# Patient Record
Sex: FEMALE | Race: WHITE | Employment: FULL TIME | URBAN - METROPOLITAN AREA
[De-identification: names, ages, dates, MRNs, and addresses within clinical notes are randomized per-mention and may not be internally consistent; named-entity substitution may affect disease eponyms.]

---

## 2017-03-29 PROBLEM — R23.2 HOT FLASHES: Status: ACTIVE | Noted: 2017-03-29

## 2017-03-29 PROBLEM — E03.9 ACQUIRED HYPOTHYROIDISM: Status: ACTIVE | Noted: 2017-03-29

## 2017-03-29 PROBLEM — R45.4 IRRITABILITY: Status: ACTIVE | Noted: 2017-03-29

## 2018-05-16 ENCOUNTER — OFFICE VISIT (OUTPATIENT)
Dept: FAMILY MEDICINE CLINIC | Age: 63
End: 2018-05-16
Payer: COMMERCIAL

## 2018-05-16 VITALS
HEART RATE: 76 BPM | RESPIRATION RATE: 15 BRPM | OXYGEN SATURATION: 96 % | DIASTOLIC BLOOD PRESSURE: 78 MMHG | BODY MASS INDEX: 25.32 KG/M2 | SYSTOLIC BLOOD PRESSURE: 122 MMHG | HEIGHT: 60 IN | WEIGHT: 129 LBS

## 2018-05-16 DIAGNOSIS — E03.9 ACQUIRED HYPOTHYROIDISM: ICD-10-CM

## 2018-05-16 DIAGNOSIS — G89.29 CHRONIC KNEE PAIN, UNSPECIFIED LATERALITY: Primary | ICD-10-CM

## 2018-05-16 DIAGNOSIS — R23.2 HOT FLASHES: ICD-10-CM

## 2018-05-16 DIAGNOSIS — R45.4 IRRITABILITY: ICD-10-CM

## 2018-05-16 DIAGNOSIS — M25.569 CHRONIC KNEE PAIN, UNSPECIFIED LATERALITY: Primary | ICD-10-CM

## 2018-05-16 PROCEDURE — 99214 OFFICE O/P EST MOD 30 MIN: CPT | Performed by: FAMILY MEDICINE

## 2018-05-16 RX ORDER — LEVOTHYROXINE SODIUM 75 MCG
TABLET ORAL
Qty: 30 TABLET | Refills: 11 | Status: SHIPPED | OUTPATIENT
Start: 2018-05-16 | End: 2018-11-21 | Stop reason: SDUPTHER

## 2018-05-16 RX ORDER — CITALOPRAM 20 MG/1
TABLET ORAL
Qty: 30 TABLET | Refills: 5 | Status: SHIPPED | OUTPATIENT
Start: 2018-05-16 | End: 2019-11-01 | Stop reason: ALTCHOICE

## 2018-06-03 ASSESSMENT — ENCOUNTER SYMPTOMS
CHEST TIGHTNESS: 0
COUGH: 0
WHEEZING: 0
BACK PAIN: 0
NAUSEA: 0
COLOR CHANGE: 0
SHORTNESS OF BREATH: 0
VOMITING: 0
BLOOD IN STOOL: 0
EYE REDNESS: 0
CONSTIPATION: 0
ABDOMINAL PAIN: 0
DIARRHEA: 0

## 2018-07-25 ENCOUNTER — HOSPITAL ENCOUNTER (OUTPATIENT)
Dept: GENERAL RADIOLOGY | Age: 63
Discharge: HOME | End: 2018-07-27
Payer: COMMERCIAL

## 2018-07-25 DIAGNOSIS — Z12.31 VISIT FOR SCREENING MAMMOGRAM: ICD-10-CM

## 2018-07-25 PROCEDURE — 77063 BREAST TOMOSYNTHESIS BI: CPT

## 2018-08-07 ENCOUNTER — TELEPHONE (OUTPATIENT)
Dept: FAMILY MEDICINE CLINIC | Age: 63
End: 2018-08-07

## 2018-08-07 DIAGNOSIS — G89.29 CHRONIC KNEE PAIN, UNSPECIFIED LATERALITY: Primary | ICD-10-CM

## 2018-08-07 DIAGNOSIS — M25.569 CHRONIC KNEE PAIN, UNSPECIFIED LATERALITY: Primary | ICD-10-CM

## 2018-08-07 NOTE — TELEPHONE ENCOUNTER
Pt called and is asking for a referral for Dr Shawanda Starks, and would like it faxed to [phone]  Please let patient know when this has been done [phone]

## 2018-11-21 ENCOUNTER — OFFICE VISIT (OUTPATIENT)
Dept: FAMILY MEDICINE CLINIC | Age: 63
End: 2018-11-21
Payer: COMMERCIAL

## 2018-11-21 VITALS
WEIGHT: 132 LBS | HEIGHT: 60 IN | DIASTOLIC BLOOD PRESSURE: 72 MMHG | TEMPERATURE: 97.2 F | BODY MASS INDEX: 25.91 KG/M2 | HEART RATE: 69 BPM | SYSTOLIC BLOOD PRESSURE: 134 MMHG | RESPIRATION RATE: 14 BRPM | OXYGEN SATURATION: 98 %

## 2018-11-21 DIAGNOSIS — E03.9 ACQUIRED HYPOTHYROIDISM: ICD-10-CM

## 2018-11-21 DIAGNOSIS — Z00.00 ENCOUNTER FOR PREVENTIVE HEALTH EXAMINATION: Primary | ICD-10-CM

## 2018-11-21 PROCEDURE — 99396 PREV VISIT EST AGE 40-64: CPT | Performed by: FAMILY MEDICINE

## 2018-11-21 RX ORDER — LEVOTHYROXINE SODIUM 75 MCG
TABLET ORAL
Qty: 30 TABLET | Refills: 11 | Status: SHIPPED | OUTPATIENT
Start: 2018-11-21 | End: 2018-12-02

## 2018-11-21 ASSESSMENT — ENCOUNTER SYMPTOMS
CONSTIPATION: 0
BACK PAIN: 0
ABDOMINAL PAIN: 0
WHEEZING: 0
DIARRHEA: 0
BLOOD IN STOOL: 0
VOMITING: 0
NAUSEA: 0
COUGH: 0
COLOR CHANGE: 0
CHEST TIGHTNESS: 0
SHORTNESS OF BREATH: 0
EYE REDNESS: 0

## 2018-11-21 ASSESSMENT — PATIENT HEALTH QUESTIONNAIRE - PHQ9
SUM OF ALL RESPONSES TO PHQ QUESTIONS 1-9: 0
2. FEELING DOWN, DEPRESSED OR HOPELESS: 0
SUM OF ALL RESPONSES TO PHQ QUESTIONS 1-9: 0
1. LITTLE INTEREST OR PLEASURE IN DOING THINGS: 0
SUM OF ALL RESPONSES TO PHQ9 QUESTIONS 1 & 2: 0

## 2018-11-28 ENCOUNTER — HOSPITAL ENCOUNTER (OUTPATIENT)
Age: 63
Discharge: HOME | End: 2018-11-28
Payer: COMMERCIAL

## 2018-11-28 DIAGNOSIS — E03.9 ACQUIRED HYPOTHYROIDISM: ICD-10-CM

## 2018-11-28 DIAGNOSIS — Z00.00 ENCOUNTER FOR PREVENTIVE HEALTH EXAMINATION: ICD-10-CM

## 2018-11-28 LAB
ALBUMIN SERPL-MCNC: 4.5 G/DL (ref 3.5–5.2)
ALP BLD-CCNC: 100 U/L (ref 35–104)
ALT SERPL-CCNC: 19 U/L (ref 0–32)
ANION GAP SERPL CALCULATED.3IONS-SCNC: 10 MMOL/L (ref 7–16)
AST SERPL-CCNC: 27 U/L (ref 0–31)
BASOPHILS ABSOLUTE: 0.04 E9/L (ref 0–0.2)
BASOPHILS RELATIVE PERCENT: 1 % (ref 0–2)
BILIRUB SERPL-MCNC: 0.6 MG/DL (ref 0–1.2)
BUN BLDV-MCNC: 11 MG/DL (ref 8–23)
CALCIUM SERPL-MCNC: 8.4 MG/DL (ref 8.6–10.2)
CHLORIDE BLD-SCNC: 104 MMOL/L (ref 98–107)
CHOLESTEROL, TOTAL: 218 MG/DL (ref 0–199)
CO2: 29 MMOL/L (ref 22–29)
CREAT SERPL-MCNC: 0.9 MG/DL (ref 0.5–1)
EOSINOPHILS ABSOLUTE: 0.29 E9/L (ref 0.05–0.5)
EOSINOPHILS RELATIVE PERCENT: 7.1 % (ref 0–6)
GFR AFRICAN AMERICAN: >60
GFR NON-AFRICAN AMERICAN: >60 ML/MIN/1.73
GLUCOSE BLD-MCNC: 105 MG/DL (ref 74–99)
HCT VFR BLD CALC: 40.5 % (ref 34–48)
HDLC SERPL-MCNC: 76 MG/DL
HEMOGLOBIN: 13.1 G/DL (ref 11.5–15.5)
IMMATURE GRANULOCYTES #: 0 E9/L
IMMATURE GRANULOCYTES %: 0 % (ref 0–5)
LDL CHOLESTEROL CALCULATED: 124 MG/DL (ref 0–99)
LYMPHOCYTES ABSOLUTE: 1.3 E9/L (ref 1.5–4)
LYMPHOCYTES RELATIVE PERCENT: 31.9 % (ref 20–42)
MCH RBC QN AUTO: 29.6 PG (ref 26–35)
MCHC RBC AUTO-ENTMCNC: 32.3 % (ref 32–34.5)
MCV RBC AUTO: 91.4 FL (ref 80–99.9)
MONOCYTES ABSOLUTE: 0.36 E9/L (ref 0.1–0.95)
MONOCYTES RELATIVE PERCENT: 8.8 % (ref 2–12)
NEUTROPHILS ABSOLUTE: 2.08 E9/L (ref 1.8–7.3)
NEUTROPHILS RELATIVE PERCENT: 51.2 % (ref 43–80)
PDW BLD-RTO: 13.2 FL (ref 11.5–15)
PLATELET # BLD: 281 E9/L (ref 130–450)
PMV BLD AUTO: 11 FL (ref 7–12)
POTASSIUM SERPL-SCNC: 4 MMOL/L (ref 3.5–5)
RBC # BLD: 4.43 E12/L (ref 3.5–5.5)
SODIUM BLD-SCNC: 143 MMOL/L (ref 132–146)
TOTAL PROTEIN: 7 G/DL (ref 6.4–8.3)
TRIGL SERPL-MCNC: 88 MG/DL (ref 0–149)
TSH SERPL DL<=0.05 MIU/L-ACNC: 0.16 UIU/ML (ref 0.27–4.2)
VLDLC SERPL CALC-MCNC: 18 MG/DL
WBC # BLD: 4.1 E9/L (ref 4.5–11.5)

## 2018-11-28 PROCEDURE — 84443 ASSAY THYROID STIM HORMONE: CPT

## 2018-11-28 PROCEDURE — 36415 COLL VENOUS BLD VENIPUNCTURE: CPT

## 2018-11-28 PROCEDURE — 85025 COMPLETE CBC W/AUTO DIFF WBC: CPT

## 2018-11-28 PROCEDURE — 80053 COMPREHEN METABOLIC PANEL: CPT

## 2018-11-28 PROCEDURE — 80061 LIPID PANEL: CPT

## 2018-12-02 DIAGNOSIS — E03.9 ACQUIRED HYPOTHYROIDISM: ICD-10-CM

## 2018-12-02 RX ORDER — LEVOTHYROXINE SODIUM 50 MCG
50 TABLET ORAL DAILY
Qty: 30 TABLET | Refills: 2 | Status: SHIPPED | OUTPATIENT
Start: 2018-12-02 | End: 2019-01-29 | Stop reason: SDUPTHER

## 2019-01-29 DIAGNOSIS — E03.9 ACQUIRED HYPOTHYROIDISM: ICD-10-CM

## 2019-01-30 RX ORDER — LEVOTHYROXINE SODIUM 50 MCG
50 TABLET ORAL DAILY
Qty: 30 TABLET | Refills: 2 | Status: SHIPPED | OUTPATIENT
Start: 2019-01-30 | End: 2019-05-28 | Stop reason: SDUPTHER

## 2019-05-28 DIAGNOSIS — E03.9 ACQUIRED HYPOTHYROIDISM: ICD-10-CM

## 2019-05-28 RX ORDER — LEVOTHYROXINE SODIUM 50 MCG
TABLET ORAL
Qty: 30 TABLET | Refills: 0 | Status: SHIPPED | OUTPATIENT
Start: 2019-05-28 | End: 2019-06-23 | Stop reason: SDUPTHER

## 2019-06-23 DIAGNOSIS — E03.9 ACQUIRED HYPOTHYROIDISM: ICD-10-CM

## 2019-06-26 RX ORDER — LEVOTHYROXINE SODIUM 50 MCG
TABLET ORAL
Qty: 30 TABLET | Refills: 5 | Status: SHIPPED | OUTPATIENT
Start: 2019-06-26 | End: 2019-11-13 | Stop reason: ALTCHOICE

## 2019-09-05 ENCOUNTER — HOSPITAL ENCOUNTER (OUTPATIENT)
Dept: GENERAL RADIOLOGY | Age: 64
Discharge: HOME | End: 2019-09-07
Payer: COMMERCIAL

## 2019-09-05 DIAGNOSIS — Z12.31 VISIT FOR SCREENING MAMMOGRAM: ICD-10-CM

## 2019-09-05 PROCEDURE — 77063 BREAST TOMOSYNTHESIS BI: CPT

## 2019-09-09 ENCOUNTER — TELEPHONE (OUTPATIENT)
Dept: ONCOLOGY | Age: 64
End: 2019-09-09

## 2019-09-12 ENCOUNTER — HOSPITAL ENCOUNTER (OUTPATIENT)
Dept: GENERAL RADIOLOGY | Age: 64
Discharge: HOME | End: 2019-09-14
Payer: COMMERCIAL

## 2019-09-12 DIAGNOSIS — R92.8 ABNORMAL MAMMOGRAM: ICD-10-CM

## 2019-09-12 PROCEDURE — 77063 BREAST TOMOSYNTHESIS BI: CPT

## 2019-09-12 PROCEDURE — 77065 DX MAMMO INCL CAD UNI: CPT

## 2019-09-13 ENCOUNTER — HOSPITAL ENCOUNTER (OUTPATIENT)
Dept: GENERAL RADIOLOGY | Age: 64
Discharge: HOME | End: 2019-09-15
Payer: COMMERCIAL

## 2019-09-13 DIAGNOSIS — R92.8 ABNORMALITY OF RIGHT BREAST ON SCREENING MAMMOGRAM: ICD-10-CM

## 2019-09-13 PROCEDURE — 2500000003 HC RX 250 WO HCPCS

## 2019-09-13 PROCEDURE — 2720000010 MAM STEREO BREAST BX W LOC DEVICE 1ST LESION RIGHT

## 2019-09-13 PROCEDURE — 77065 DX MAMMO INCL CAD UNI: CPT

## 2019-09-13 PROCEDURE — 88305 TISSUE EXAM BY PATHOLOGIST: CPT

## 2019-09-18 ENCOUNTER — TELEPHONE (OUTPATIENT)
Dept: GENERAL RADIOLOGY | Age: 64
End: 2019-09-18

## 2019-09-19 ENCOUNTER — TELEPHONE (OUTPATIENT)
Dept: BREAST CENTER | Age: 64
End: 2019-09-19

## 2019-10-01 ENCOUNTER — OFFICE VISIT (OUTPATIENT)
Dept: BREAST CENTER | Age: 64
End: 2019-10-01
Payer: COMMERCIAL

## 2019-10-01 VITALS
TEMPERATURE: 97.8 F | DIASTOLIC BLOOD PRESSURE: 76 MMHG | RESPIRATION RATE: 16 BRPM | SYSTOLIC BLOOD PRESSURE: 128 MMHG | HEIGHT: 60 IN | BODY MASS INDEX: 25.72 KG/M2 | HEART RATE: 71 BPM | WEIGHT: 131 LBS | OXYGEN SATURATION: 98 %

## 2019-10-01 DIAGNOSIS — N60.99 ATYPICAL DUCTAL HYPERPLASIA OF BREAST: ICD-10-CM

## 2019-10-01 PROCEDURE — 99204 OFFICE O/P NEW MOD 45 MIN: CPT | Performed by: SURGERY

## 2019-10-01 PROCEDURE — 99203 OFFICE O/P NEW LOW 30 MIN: CPT | Performed by: SURGERY

## 2019-10-08 ENCOUNTER — TELEPHONE (OUTPATIENT)
Dept: BREAST CENTER | Age: 64
End: 2019-10-08

## 2019-10-10 ENCOUNTER — TELEPHONE (OUTPATIENT)
Dept: BREAST CENTER | Age: 64
End: 2019-10-10

## 2019-10-20 ENCOUNTER — PREP FOR PROCEDURE (OUTPATIENT)
Dept: SURGERY | Age: 64
End: 2019-10-20

## 2019-10-20 RX ORDER — SODIUM CHLORIDE 0.9 % (FLUSH) 0.9 %
10 SYRINGE (ML) INJECTION PRN
Status: CANCELLED | OUTPATIENT
Start: 2019-10-20

## 2019-10-20 RX ORDER — SODIUM CHLORIDE, SODIUM LACTATE, POTASSIUM CHLORIDE, CALCIUM CHLORIDE 600; 310; 30; 20 MG/100ML; MG/100ML; MG/100ML; MG/100ML
INJECTION, SOLUTION INTRAVENOUS CONTINUOUS
Status: CANCELLED | OUTPATIENT
Start: 2019-10-20

## 2019-10-20 RX ORDER — SODIUM CHLORIDE 0.9 % (FLUSH) 0.9 %
10 SYRINGE (ML) INJECTION EVERY 12 HOURS SCHEDULED
Status: CANCELLED | OUTPATIENT
Start: 2019-10-20

## 2019-10-29 ENCOUNTER — TELEPHONE (OUTPATIENT)
Dept: ADMINISTRATIVE | Age: 64
End: 2019-10-29

## 2019-11-06 ENCOUNTER — APPOINTMENT (OUTPATIENT)
Dept: GENERAL RADIOLOGY | Age: 64
End: 2019-11-06
Attending: SURGERY
Payer: COMMERCIAL

## 2019-11-06 ENCOUNTER — HOSPITAL ENCOUNTER (OUTPATIENT)
Dept: GENERAL RADIOLOGY | Age: 64
Setting detail: OUTPATIENT SURGERY
Discharge: HOME | End: 2019-11-08
Attending: SURGERY
Payer: COMMERCIAL

## 2019-11-06 ENCOUNTER — ANESTHESIA EVENT (OUTPATIENT)
Dept: OPERATING ROOM | Age: 64
End: 2019-11-06
Payer: COMMERCIAL

## 2019-11-06 ENCOUNTER — HOSPITAL ENCOUNTER (OUTPATIENT)
Age: 64
Setting detail: OUTPATIENT SURGERY
Discharge: HOME | End: 2019-11-06
Attending: SURGERY | Admitting: SURGERY
Payer: COMMERCIAL

## 2019-11-06 ENCOUNTER — ANESTHESIA (OUTPATIENT)
Dept: OPERATING ROOM | Age: 64
End: 2019-11-06
Payer: COMMERCIAL

## 2019-11-06 VITALS
BODY MASS INDEX: 24.54 KG/M2 | OXYGEN SATURATION: 99 % | WEIGHT: 125 LBS | TEMPERATURE: 97 F | DIASTOLIC BLOOD PRESSURE: 59 MMHG | SYSTOLIC BLOOD PRESSURE: 131 MMHG | HEART RATE: 68 BPM | HEIGHT: 60 IN | RESPIRATION RATE: 16 BRPM

## 2019-11-06 VITALS
SYSTOLIC BLOOD PRESSURE: 98 MMHG | RESPIRATION RATE: 9 BRPM | OXYGEN SATURATION: 100 % | DIASTOLIC BLOOD PRESSURE: 75 MMHG

## 2019-11-06 DIAGNOSIS — G89.18 POST-OP PAIN: Primary | ICD-10-CM

## 2019-11-06 DIAGNOSIS — N60.91 ATYPICAL DUCTAL HYPERPLASIA OF RIGHT BREAST: ICD-10-CM

## 2019-11-06 PROCEDURE — 3600000003 HC SURGERY LEVEL 3 BASE: Performed by: SURGERY

## 2019-11-06 PROCEDURE — 6360000002 HC RX W HCPCS: Performed by: NURSE ANESTHETIST, CERTIFIED REGISTERED

## 2019-11-06 PROCEDURE — 19125 EXCISION BREAST LESION: CPT | Performed by: SURGERY

## 2019-11-06 PROCEDURE — 19281 PERQ DEVICE BREAST 1ST IMAG: CPT

## 2019-11-06 PROCEDURE — 2580000003 HC RX 258: Performed by: SURGERY

## 2019-11-06 PROCEDURE — 3600000013 HC SURGERY LEVEL 3 ADDTL 15MIN: Performed by: SURGERY

## 2019-11-06 PROCEDURE — 2709999900 HC NON-CHARGEABLE SUPPLY: Performed by: SURGERY

## 2019-11-06 PROCEDURE — 88307 TISSUE EXAM BY PATHOLOGIST: CPT

## 2019-11-06 PROCEDURE — 2500000003 HC RX 250 WO HCPCS

## 2019-11-06 PROCEDURE — 6360000002 HC RX W HCPCS: Performed by: SURGERY

## 2019-11-06 PROCEDURE — 76098 X-RAY EXAM SURGICAL SPECIMEN: CPT

## 2019-11-06 PROCEDURE — 2720000010 HC SURG SUPPLY STERILE: Performed by: SURGERY

## 2019-11-06 PROCEDURE — 3700000000 HC ANESTHESIA ATTENDED CARE: Performed by: SURGERY

## 2019-11-06 PROCEDURE — 2500000003 HC RX 250 WO HCPCS: Performed by: SURGERY

## 2019-11-06 PROCEDURE — 3700000001 HC ADD 15 MINUTES (ANESTHESIA): Performed by: SURGERY

## 2019-11-06 PROCEDURE — 7100000011 HC PHASE II RECOVERY - ADDTL 15 MIN: Performed by: SURGERY

## 2019-11-06 PROCEDURE — 7100000010 HC PHASE II RECOVERY - FIRST 15 MIN: Performed by: SURGERY

## 2019-11-06 RX ORDER — LIDOCAINE HYDROCHLORIDE 20 MG/ML
INJECTION, SOLUTION INTRAVENOUS PRN
Status: DISCONTINUED | OUTPATIENT
Start: 2019-11-06 | End: 2019-11-06 | Stop reason: SDUPTHER

## 2019-11-06 RX ORDER — PROPOFOL 10 MG/ML
INJECTION, EMULSION INTRAVENOUS CONTINUOUS PRN
Status: DISCONTINUED | OUTPATIENT
Start: 2019-11-06 | End: 2019-11-06 | Stop reason: SDUPTHER

## 2019-11-06 RX ORDER — FENTANYL CITRATE 50 UG/ML
INJECTION, SOLUTION INTRAMUSCULAR; INTRAVENOUS PRN
Status: DISCONTINUED | OUTPATIENT
Start: 2019-11-06 | End: 2019-11-06 | Stop reason: SDUPTHER

## 2019-11-06 RX ORDER — CEFAZOLIN SODIUM 2 G/50ML
2 SOLUTION INTRAVENOUS
Status: COMPLETED | OUTPATIENT
Start: 2019-11-06 | End: 2019-11-06

## 2019-11-06 RX ORDER — HYDROCODONE BITARTRATE AND ACETAMINOPHEN 5; 325 MG/1; MG/1
1 TABLET ORAL EVERY 4 HOURS PRN
Qty: 5 TABLET | Refills: 0 | Status: SHIPPED | OUTPATIENT
Start: 2019-11-06 | End: 2019-11-07

## 2019-11-06 RX ORDER — BUPIVACAINE HYDROCHLORIDE AND EPINEPHRINE 2.5; 5 MG/ML; UG/ML
INJECTION, SOLUTION EPIDURAL; INFILTRATION; INTRACAUDAL; PERINEURAL PRN
Status: DISCONTINUED | OUTPATIENT
Start: 2019-11-06 | End: 2019-11-06 | Stop reason: ALTCHOICE

## 2019-11-06 RX ORDER — SODIUM CHLORIDE 0.9 % (FLUSH) 0.9 %
10 SYRINGE (ML) INJECTION EVERY 12 HOURS SCHEDULED
Status: DISCONTINUED | OUTPATIENT
Start: 2019-11-06 | End: 2019-11-06 | Stop reason: HOSPADM

## 2019-11-06 RX ORDER — MIDAZOLAM HYDROCHLORIDE 1 MG/ML
INJECTION INTRAMUSCULAR; INTRAVENOUS PRN
Status: DISCONTINUED | OUTPATIENT
Start: 2019-11-06 | End: 2019-11-06 | Stop reason: SDUPTHER

## 2019-11-06 RX ORDER — SODIUM CHLORIDE, SODIUM LACTATE, POTASSIUM CHLORIDE, CALCIUM CHLORIDE 600; 310; 30; 20 MG/100ML; MG/100ML; MG/100ML; MG/100ML
INJECTION, SOLUTION INTRAVENOUS CONTINUOUS
Status: DISCONTINUED | OUTPATIENT
Start: 2019-11-06 | End: 2019-11-06 | Stop reason: HOSPADM

## 2019-11-06 RX ORDER — SODIUM CHLORIDE 0.9 % (FLUSH) 0.9 %
10 SYRINGE (ML) INJECTION PRN
Status: DISCONTINUED | OUTPATIENT
Start: 2019-11-06 | End: 2019-11-06 | Stop reason: HOSPADM

## 2019-11-06 RX ADMIN — LIDOCAINE HYDROCHLORIDE 50 MG: 20 INJECTION, SOLUTION INTRAVENOUS at 14:20

## 2019-11-06 RX ADMIN — PROPOFOL 75 MCG/KG/MIN: 10 INJECTION, EMULSION INTRAVENOUS at 14:20

## 2019-11-06 RX ADMIN — Medication 10 ML: at 09:50

## 2019-11-06 RX ADMIN — CEFAZOLIN SODIUM 2 G: 2 SOLUTION INTRAVENOUS at 14:28

## 2019-11-06 RX ADMIN — FENTANYL CITRATE 50 MCG: 50 INJECTION, SOLUTION INTRAMUSCULAR; INTRAVENOUS at 14:31

## 2019-11-06 RX ADMIN — FENTANYL CITRATE 25 MCG: 50 INJECTION, SOLUTION INTRAMUSCULAR; INTRAVENOUS at 14:52

## 2019-11-06 RX ADMIN — FENTANYL CITRATE 25 MCG: 50 INJECTION, SOLUTION INTRAMUSCULAR; INTRAVENOUS at 14:19

## 2019-11-06 RX ADMIN — MIDAZOLAM 2 MG: 1 INJECTION INTRAMUSCULAR; INTRAVENOUS at 14:12

## 2019-11-06 RX ADMIN — SODIUM CHLORIDE, POTASSIUM CHLORIDE, SODIUM LACTATE AND CALCIUM CHLORIDE: 600; 310; 30; 20 INJECTION, SOLUTION INTRAVENOUS at 14:13

## 2019-11-06 ASSESSMENT — PULMONARY FUNCTION TESTS
PIF_VALUE: 0
PIF_VALUE: 1
PIF_VALUE: 0

## 2019-11-06 ASSESSMENT — PAIN SCALES - GENERAL
PAINLEVEL_OUTOF10: 0

## 2019-11-06 ASSESSMENT — PAIN - FUNCTIONAL ASSESSMENT: PAIN_FUNCTIONAL_ASSESSMENT: 0-10

## 2019-11-11 ENCOUNTER — TELEPHONE (OUTPATIENT)
Dept: BREAST CENTER | Age: 64
End: 2019-11-11

## 2019-11-12 ENCOUNTER — OFFICE VISIT (OUTPATIENT)
Dept: FAMILY MEDICINE CLINIC | Age: 64
End: 2019-11-12
Payer: COMMERCIAL

## 2019-11-12 ENCOUNTER — HOSPITAL ENCOUNTER (OUTPATIENT)
Age: 64
Discharge: HOME | End: 2019-11-14
Payer: COMMERCIAL

## 2019-11-12 VITALS
HEART RATE: 73 BPM | HEIGHT: 60 IN | RESPIRATION RATE: 20 BRPM | SYSTOLIC BLOOD PRESSURE: 120 MMHG | OXYGEN SATURATION: 99 % | DIASTOLIC BLOOD PRESSURE: 74 MMHG | BODY MASS INDEX: 25.52 KG/M2 | WEIGHT: 130 LBS | TEMPERATURE: 98.1 F

## 2019-11-12 DIAGNOSIS — E03.9 ACQUIRED HYPOTHYROIDISM: ICD-10-CM

## 2019-11-12 DIAGNOSIS — Z23 NEEDS FLU SHOT: ICD-10-CM

## 2019-11-12 DIAGNOSIS — E03.9 ACQUIRED HYPOTHYROIDISM: Primary | ICD-10-CM

## 2019-11-12 DIAGNOSIS — G89.29 CHRONIC LEFT-SIDED LOW BACK PAIN WITHOUT SCIATICA: ICD-10-CM

## 2019-11-12 DIAGNOSIS — M54.50 CHRONIC LEFT-SIDED LOW BACK PAIN WITHOUT SCIATICA: ICD-10-CM

## 2019-11-12 LAB
ALBUMIN SERPL-MCNC: 4.6 G/DL (ref 3.5–5.2)
ALP BLD-CCNC: 98 U/L (ref 35–104)
ALT SERPL-CCNC: 21 U/L (ref 0–32)
ANION GAP SERPL CALCULATED.3IONS-SCNC: 16 MMOL/L (ref 7–16)
AST SERPL-CCNC: 31 U/L (ref 0–31)
BASOPHILS ABSOLUTE: 0.03 E9/L (ref 0–0.2)
BASOPHILS RELATIVE PERCENT: 0.6 % (ref 0–2)
BILIRUB SERPL-MCNC: 0.5 MG/DL (ref 0–1.2)
BUN BLDV-MCNC: 12 MG/DL (ref 8–23)
CALCIUM SERPL-MCNC: 8.8 MG/DL (ref 8.6–10.2)
CHLORIDE BLD-SCNC: 102 MMOL/L (ref 98–107)
CO2: 25 MMOL/L (ref 22–29)
CREAT SERPL-MCNC: 0.9 MG/DL (ref 0.5–1)
EOSINOPHILS ABSOLUTE: 0.28 E9/L (ref 0.05–0.5)
EOSINOPHILS RELATIVE PERCENT: 5.8 % (ref 0–6)
GFR AFRICAN AMERICAN: >60
GFR NON-AFRICAN AMERICAN: >60 ML/MIN/1.73
GLUCOSE BLD-MCNC: 95 MG/DL (ref 74–99)
HCT VFR BLD CALC: 42.1 % (ref 34–48)
HEMOGLOBIN: 13.2 G/DL (ref 11.5–15.5)
IMMATURE GRANULOCYTES #: 0.01 E9/L
IMMATURE GRANULOCYTES %: 0.2 % (ref 0–5)
LYMPHOCYTES ABSOLUTE: 1.28 E9/L (ref 1.5–4)
LYMPHOCYTES RELATIVE PERCENT: 26.4 % (ref 20–42)
MCH RBC QN AUTO: 28.8 PG (ref 26–35)
MCHC RBC AUTO-ENTMCNC: 31.4 % (ref 32–34.5)
MCV RBC AUTO: 91.7 FL (ref 80–99.9)
MONOCYTES ABSOLUTE: 0.36 E9/L (ref 0.1–0.95)
MONOCYTES RELATIVE PERCENT: 7.4 % (ref 2–12)
NEUTROPHILS ABSOLUTE: 2.88 E9/L (ref 1.8–7.3)
NEUTROPHILS RELATIVE PERCENT: 59.6 % (ref 43–80)
PDW BLD-RTO: 13.3 FL (ref 11.5–15)
PLATELET # BLD: 292 E9/L (ref 130–450)
PMV BLD AUTO: 11.9 FL (ref 7–12)
POTASSIUM SERPL-SCNC: 4.3 MMOL/L (ref 3.5–5)
RBC # BLD: 4.59 E12/L (ref 3.5–5.5)
SODIUM BLD-SCNC: 143 MMOL/L (ref 132–146)
TOTAL PROTEIN: 7.3 G/DL (ref 6.4–8.3)
WBC # BLD: 4.8 E9/L (ref 4.5–11.5)

## 2019-11-12 PROCEDURE — 90686 IIV4 VACC NO PRSV 0.5 ML IM: CPT | Performed by: FAMILY MEDICINE

## 2019-11-12 PROCEDURE — 84439 ASSAY OF FREE THYROXINE: CPT

## 2019-11-12 PROCEDURE — 90471 IMMUNIZATION ADMIN: CPT | Performed by: FAMILY MEDICINE

## 2019-11-12 PROCEDURE — 99214 OFFICE O/P EST MOD 30 MIN: CPT | Performed by: FAMILY MEDICINE

## 2019-11-12 PROCEDURE — 85025 COMPLETE CBC W/AUTO DIFF WBC: CPT

## 2019-11-12 PROCEDURE — 84443 ASSAY THYROID STIM HORMONE: CPT

## 2019-11-12 PROCEDURE — 80053 COMPREHEN METABOLIC PANEL: CPT

## 2019-11-12 ASSESSMENT — PATIENT HEALTH QUESTIONNAIRE - PHQ9
SUM OF ALL RESPONSES TO PHQ9 QUESTIONS 1 & 2: 0
2. FEELING DOWN, DEPRESSED OR HOPELESS: 0
1. LITTLE INTEREST OR PLEASURE IN DOING THINGS: 0
SUM OF ALL RESPONSES TO PHQ QUESTIONS 1-9: 0
SUM OF ALL RESPONSES TO PHQ QUESTIONS 1-9: 0

## 2019-11-13 ENCOUNTER — TELEPHONE (OUTPATIENT)
Dept: FAMILY MEDICINE CLINIC | Age: 64
End: 2019-11-13

## 2019-11-13 DIAGNOSIS — E03.9 ACQUIRED HYPOTHYROIDISM: ICD-10-CM

## 2019-11-13 LAB
T4 FREE: 0.95 NG/DL (ref 0.93–1.7)
TSH SERPL DL<=0.05 MIU/L-ACNC: 6.67 UIU/ML (ref 0.27–4.2)

## 2019-11-13 RX ORDER — LEVOTHYROXINE SODIUM 75 MCG
75 TABLET ORAL DAILY
Qty: 30 TABLET | Refills: 3
Start: 2019-11-13 | End: 2019-11-18 | Stop reason: SDUPTHER

## 2019-11-18 ENCOUNTER — TELEPHONE (OUTPATIENT)
Dept: FAMILY MEDICINE CLINIC | Age: 64
End: 2019-11-18

## 2019-11-18 DIAGNOSIS — E03.9 ACQUIRED HYPOTHYROIDISM: ICD-10-CM

## 2019-11-18 RX ORDER — LEVOTHYROXINE SODIUM 75 MCG
75 TABLET ORAL DAILY
Qty: 30 TABLET | Refills: 3 | Status: SHIPPED
Start: 2019-11-18 | End: 2020-02-11 | Stop reason: SDUPTHER

## 2019-11-21 ENCOUNTER — OFFICE VISIT (OUTPATIENT)
Dept: BREAST CENTER | Age: 64
End: 2019-11-21
Payer: COMMERCIAL

## 2019-11-21 VITALS
HEART RATE: 69 BPM | OXYGEN SATURATION: 98 % | RESPIRATION RATE: 16 BRPM | BODY MASS INDEX: 25.32 KG/M2 | DIASTOLIC BLOOD PRESSURE: 70 MMHG | WEIGHT: 129 LBS | TEMPERATURE: 98 F | SYSTOLIC BLOOD PRESSURE: 108 MMHG | HEIGHT: 60 IN

## 2019-11-21 DIAGNOSIS — Z48.89 ENCOUNTER FOR POSTOPERATIVE CARE: Primary | ICD-10-CM

## 2019-11-21 PROCEDURE — 99024 POSTOP FOLLOW-UP VISIT: CPT | Performed by: SURGERY

## 2020-02-11 ENCOUNTER — HOSPITAL ENCOUNTER (OUTPATIENT)
Age: 65
Discharge: HOME | End: 2020-02-13
Payer: COMMERCIAL

## 2020-02-11 ENCOUNTER — TELEPHONE (OUTPATIENT)
Dept: FAMILY MEDICINE CLINIC | Age: 65
End: 2020-02-11

## 2020-02-11 ENCOUNTER — NURSE ONLY (OUTPATIENT)
Dept: FAMILY MEDICINE CLINIC | Age: 65
End: 2020-02-11
Payer: COMMERCIAL

## 2020-02-11 ENCOUNTER — OFFICE VISIT (OUTPATIENT)
Dept: FAMILY MEDICINE CLINIC | Age: 65
End: 2020-02-11
Payer: COMMERCIAL

## 2020-02-11 VITALS
OXYGEN SATURATION: 98 % | RESPIRATION RATE: 16 BRPM | HEIGHT: 61 IN | WEIGHT: 131.4 LBS | BODY MASS INDEX: 24.81 KG/M2 | SYSTOLIC BLOOD PRESSURE: 122 MMHG | TEMPERATURE: 97.4 F | HEART RATE: 74 BPM | DIASTOLIC BLOOD PRESSURE: 64 MMHG

## 2020-02-11 LAB
BASOPHILS ABSOLUTE: 0.04 E9/L (ref 0–0.2)
BASOPHILS RELATIVE PERCENT: 0.8 % (ref 0–2)
EOSINOPHILS ABSOLUTE: 0.44 E9/L (ref 0.05–0.5)
EOSINOPHILS RELATIVE PERCENT: 8.4 % (ref 0–6)
HCT VFR BLD CALC: 39.8 % (ref 34–48)
HEMOGLOBIN: 12.3 G/DL (ref 11.5–15.5)
IMMATURE GRANULOCYTES #: 0.01 E9/L
IMMATURE GRANULOCYTES %: 0.2 % (ref 0–5)
LYMPHOCYTES ABSOLUTE: 1.77 E9/L (ref 1.5–4)
LYMPHOCYTES RELATIVE PERCENT: 33.8 % (ref 20–42)
MCH RBC QN AUTO: 28.9 PG (ref 26–35)
MCHC RBC AUTO-ENTMCNC: 30.9 % (ref 32–34.5)
MCV RBC AUTO: 93.4 FL (ref 80–99.9)
MONOCYTES ABSOLUTE: 0.51 E9/L (ref 0.1–0.95)
MONOCYTES RELATIVE PERCENT: 9.7 % (ref 2–12)
NEUTROPHILS ABSOLUTE: 2.47 E9/L (ref 1.8–7.3)
NEUTROPHILS RELATIVE PERCENT: 47.1 % (ref 43–80)
PDW BLD-RTO: 13.5 FL (ref 11.5–15)
PLATELET # BLD: 270 E9/L (ref 130–450)
PMV BLD AUTO: 11.3 FL (ref 7–12)
RBC # BLD: 4.26 E12/L (ref 3.5–5.5)
T4 FREE: 1.12 NG/DL (ref 0.93–1.7)
TSH SERPL DL<=0.05 MIU/L-ACNC: 1.31 UIU/ML (ref 0.27–4.2)
WBC # BLD: 5.2 E9/L (ref 4.5–11.5)

## 2020-02-11 PROCEDURE — 99214 OFFICE O/P EST MOD 30 MIN: CPT | Performed by: FAMILY MEDICINE

## 2020-02-11 PROCEDURE — 84443 ASSAY THYROID STIM HORMONE: CPT

## 2020-02-11 PROCEDURE — 36415 COLL VENOUS BLD VENIPUNCTURE: CPT | Performed by: FAMILY MEDICINE

## 2020-02-11 PROCEDURE — 85025 COMPLETE CBC W/AUTO DIFF WBC: CPT

## 2020-02-11 PROCEDURE — 84439 ASSAY OF FREE THYROXINE: CPT

## 2020-02-11 RX ORDER — FLUTICASONE PROPIONATE 50 MCG
1 SPRAY, SUSPENSION (ML) NASAL DAILY
Qty: 1 BOTTLE | Refills: 3 | Status: SHIPPED
Start: 2020-02-11 | End: 2022-01-25

## 2020-02-11 RX ORDER — LEVOTHYROXINE SODIUM 75 MCG
75 TABLET ORAL DAILY
Qty: 30 TABLET | Refills: 3 | Status: SHIPPED
Start: 2020-02-11 | End: 2020-06-17

## 2020-02-11 RX ORDER — AZITHROMYCIN 250 MG/1
250 TABLET, FILM COATED ORAL SEE ADMIN INSTRUCTIONS
Qty: 6 TABLET | Refills: 0 | Status: SHIPPED | OUTPATIENT
Start: 2020-02-11 | End: 2020-02-16

## 2020-02-11 ASSESSMENT — ENCOUNTER SYMPTOMS
SHORTNESS OF BREATH: 0
NAUSEA: 0
SINUS PAIN: 1
RHINORRHEA: 1
SINUS PRESSURE: 1
BACK PAIN: 0
COUGH: 1
WHEEZING: 1
ABDOMINAL PAIN: 0
DIARRHEA: 0
SORE THROAT: 1
CONSTIPATION: 0
VOMITING: 0

## 2020-02-11 ASSESSMENT — PATIENT HEALTH QUESTIONNAIRE - PHQ9
2. FEELING DOWN, DEPRESSED OR HOPELESS: 0
SUM OF ALL RESPONSES TO PHQ9 QUESTIONS 1 & 2: 0
1. LITTLE INTEREST OR PLEASURE IN DOING THINGS: 0
SUM OF ALL RESPONSES TO PHQ QUESTIONS 1-9: 0
SUM OF ALL RESPONSES TO PHQ QUESTIONS 1-9: 0

## 2020-02-11 NOTE — TELEPHONE ENCOUNTER
Jory Mims was in the office this morning to have blood drawn. During her visit she expressed concerns over sinus issues that have been on going. She states she has tried OTC medication and it just hasn't worked. She wants to know what else she can do. Also can you please place lab order for today. There wasn't one in the system.  We jose antonio a cbc, tsh & t4

## 2020-02-11 NOTE — PROGRESS NOTES
and rhinorrhea present. Right Sinus: Maxillary sinus tenderness present. Mouth/Throat:      Pharynx: Posterior oropharyngeal erythema present. Eyes:      Conjunctiva/sclera: Conjunctivae normal.      Pupils: Pupils are equal, round, and reactive to light. Neck:      Musculoskeletal: Normal range of motion and neck supple. Thyroid: No thyromegaly. Cardiovascular:      Rate and Rhythm: Normal rate and regular rhythm. Heart sounds: Normal heart sounds. Pulmonary:      Effort: Pulmonary effort is normal.      Breath sounds: Normal breath sounds. No wheezing. Abdominal:      General: Bowel sounds are normal.      Palpations: Abdomen is soft. Tenderness: There is no abdominal tenderness. Musculoskeletal: Normal range of motion. Skin:     General: Skin is warm and dry. Findings: No rash. Neurological:      Mental Status: She is alert and oriented to person, place, and time. Deep Tendon Reflexes: Reflexes are normal and symmetric. Psychiatric:         Behavior: Behavior normal.         Assessment:  1. Chronic maxillary sinusitis  Due to penicillin allergy will start azithromycin  Side effects reviewed  Will start flonase for chronic sinusitis  If sinus issues continue, will consider referral to ENT for further evaluation. - azithromycin (ZITHROMAX) 250 MG tablet; Take 1 tablet by mouth See Admin Instructions for 5 days 500mg on day 1 followed by 250mg on days 2 - 5  Dispense: 6 tablet; Refill: 0  - fluticasone (FLONASE) 50 MCG/ACT nasal spray; 1 spray by Nasal route daily  Dispense: 1 Bottle; Refill: 3    2. Acquired hypothyroidism  Will refill synthroid  Labs done today  - SYNTHROID 75 MCG tablet; Take 1 tablet by mouth Daily  Dispense: 30 tablet; Refill: 3    3. Colon cancer screening  Referral to Dr. Ronda Nguyen for colon cancer screening  - Lew Zhang MD, General Surgery, [place]      Plan:  As above.   Call or go to ED immediately if symptoms worsen orpersist.  Return if symptoms worsen or fail to improve. , or sooner if necessary. Educational materials and/or home exercises printed for patient's review and were included inpatient instructions on his/her After Visit Summary and given to patient at the end of visit. Counseled regarding above diagnosis, includingpossible risks and complications,  especially if left uncontrolled. Counseled regarding the possible side effects, risks, benefits and alternatives to treatment; patient and/or guardian verbalizes understanding,agrees, feels comfortable with and wishes to proceed with above treatment plan. Advised patient to call with any new medication issues, and read all Rx info from pharmacy to assure aware of all possible risks andside effects of medication before taking. Reviewed age and gender appropriate health screening exams and vaccinations. Advised patient regarding importance of keeping up with recommended health maintenance and toschedule as soon as possible if overdue, as this is important in assessing for undiagnosed pathology, especially cancer, as well as protecting against potentially harmful/life threatening disease. Patientand/or guardian verbalizes understanding and agrees with above counseling, assessment and plan. All questions answered.

## 2020-02-11 NOTE — PATIENT INSTRUCTIONS
Patient Education        azithromycin (oral/injection)  Pronunciation:  rashid DE LA CRUZTH kalin MYE sin  Brand:  Azithromycin 3 Day Dose Pack, Azithromycin 5 Day Dose Pack, Zithromax, Zithromax IV, Zithromax TRI-WILLIE, Zithromax Z-Willie  What is the most important information I should know about azithromycin? You should not use azithromycin if you have ever had an allergic reaction, jaundice, or liver problems while taking this medicine. You should not use azithromycin if you have ever had a severe allergic reaction to similar drugs such as clarithromycin, erythromycin, or telithromycin. What is azithromycin? Azithromycin is used to treat many different types of infections caused by bacteria, including infections of the lungs, sinus, throat, tonsils, skin, urinary tract, cervix, or genitals. Azithromycin may also be used for purposes not listed in this medication guide. What should I discuss with my healthcare provider before using azithromycin? You should not use azithromycin if you are allergic to it, or if you have ever had:  · jaundice or liver problems caused by taking azithromycin; or  · a severe allergic reaction to similar drugs such as clarithromycin, erythromycin, or telithromycin. Azithromycin oral should not be used to treat pneumonia in people who have:  · cystic fibrosis;  · an infection after being in a hospital;  · an infection in the blood;  · a weak immune system (caused by diseases such as HIV/AIDS or cancer); or  · in older adults and those who are ill or debilitated. Tell your doctor if you have ever had:  · pneumonia;  · liver or kidney disease;  · myasthenia gravis;  · low levels of potassium in your blood;  · a heart rhythm disorder; or  · long QT syndrome (in you or a family member). It is not known  whether this medicine is effective in treating genital ulcers in women. Tell your doctor if you are pregnant or breastfeeding.  Taking azithromycin while breastfeeding may cause diarrhea, vomiting, or rash in the nursing baby. Azithromycin is not approved for use by anyone younger than 7 months old. Azithromycin should not be used to treat a throat or tonsil infection in a child younger than 3years old. How should I take azithromycin? Follow all directions on your prescription label and read all medication guides or instruction sheets. Use the medicine exactly as directed. Azithromycin oral  is taken by mouth. Azithromycin injection is given as an infusion into a vein, usually for 2 days before you switch to azithromycin oral. A healthcare provider will give you this injection. You may take azithromycin oral with or without food. Shake the oral suspension (liquid) before you measure a dose. Use the dosing syringe provided, or use a medicine dose-measuring device (not a kitchen spoon). Use this medicine for the full prescribed length of time, even if your symptoms quickly improve. Skipping doses can increase your risk of infection that is resistant to medication. Azithromycin will not treat a viral infection such as the flu or a common cold. Store at room temperature away from moisture and heat. Throw away any unused liquid medicine after 10 days. What happens if I miss a dose? Take the medicine as soon as you can, but skip the missed dose if it is almost time for your next dose. Do not take two doses at one time. What happens if I overdose? Seek emergency medical attention or call the Poison Help line at 1-383.554.5544. What should I avoid while taking azithromycin? Antibiotic medicines can cause diarrhea, which may be a sign of a new infection. If you have diarrhea that is watery or bloody, call your doctor before using anti-diarrhea medicine. Azithromycin could make you sunburn more easily. Avoid sunlight or tanning beds. Wear protective clothing and use sunscreen (SPF 30 or higher) when you are outdoors. What are the possible side effects of azithromycin?   Get emergency medical help if you have signs of an allergic reaction (hives, difficult breathing, swelling in your face or throat) or a severe skin reaction (fever, sore throat, burning in your eyes, skin pain, red or purple skin rash that spreads and causes blistering and peeling). Seek medical treatment if you have a serious drug reaction that can affect many parts of your body. Symptoms may include: skin rash, fever, swollen glands, muscle aches, severe weakness, unusual bruising, or yellowing of your skin or eyes. Call your doctor at once if you have:  · severe stomach pain, diarrhea that is watery or bloody;  · fast or pounding heartbeats, fluttering in your chest, shortness of breath, and sudden dizziness (like you might pass out); or  · liver problems --nausea, vomiting, loss of appetite, stomach pain (upper right side), tiredness, itching, dark urine, stefany-colored stools, jaundice (yellowing of the skin or eyes); Call your doctor right away if a baby taking azithromycin becomes irritable or vomits while eating or nursing. Older adults may be more likely to have side effects on heart rhythm, including a life-threatening fast heart rate. Common side effects may include:  · nausea, vomiting; or  · stomach pain. This is not a complete list of side effects and others may occur. Call your doctor for medical advice about side effects. You may report side effects to FDA at 8-921-FDA-1473. What other drugs will affect azithromycin? Tell your doctor about all your other medicines, especially:  · colchicine;  · digoxin;  · nelfinavir;  · phenytoin;  · an antacid that contains aluminum or magnesium --Acid Gone, Gaviscon, Gelusil, Maalox, Milk of Magnesia, Mylanta, Pepcid Complete, Rolaids, Rulox, and others; or  · a blood thinner --warfarin, Coumadin, Jantoven. This list is not complete. Other drugs may affect azithromycin, including prescription and over-the-counter medicines, vitamins, and herbal products.  Not all possible drug interactions

## 2020-02-11 NOTE — TELEPHONE ENCOUNTER
She should be seen for a possible infection and need for antibiotics. What OTC medication has she used? Labs ordered.

## 2020-02-12 ENCOUNTER — TELEPHONE (OUTPATIENT)
Dept: FAMILY MEDICINE CLINIC | Age: 65
End: 2020-02-12

## 2020-02-14 ENCOUNTER — TELEPHONE (OUTPATIENT)
Dept: SURGERY | Age: 65
End: 2020-02-14

## 2020-02-14 NOTE — TELEPHONE ENCOUNTER
Spoke with her sukhi Wilkinson. Scheduled the patient on 3/12/20 at 1:15pm in [facility]. Bring ID, medication list, insurance card and co-pay. Gave him the directions to the office. The sukhi verbalized understanding. Appt letter mailed.   Electronically signed by Soo Diaz on 2/14/2020 at 3:01 PM

## 2020-02-20 ENCOUNTER — TELEPHONE (OUTPATIENT)
Dept: FAMILY MEDICINE CLINIC | Age: 65
End: 2020-02-20

## 2020-02-20 NOTE — TELEPHONE ENCOUNTER
Last Appointment:  2/11/2020  Future Appointments   Date Time Provider Zeke Cardenas   3/12/2020  1:15 PM MD Sandra Gold [facility]   5/12/2020  9:00 AM DO Sandra Mor[facility]      Patient called stating Saurav Ayala did not help  Still having pain, pressure and sinus drainage. patient wondering if something else can be called in

## 2020-02-21 RX ORDER — DOXYCYCLINE HYCLATE 100 MG
100 TABLET ORAL 2 TIMES DAILY WITH MEALS
Qty: 20 TABLET | Refills: 0 | Status: SHIPPED | OUTPATIENT
Start: 2020-02-21 | End: 2020-03-02

## 2020-03-12 ENCOUNTER — OFFICE VISIT (OUTPATIENT)
Dept: SURGERY | Age: 65
End: 2020-03-12

## 2020-03-12 VITALS
SYSTOLIC BLOOD PRESSURE: 108 MMHG | OXYGEN SATURATION: 98 % | WEIGHT: 133 LBS | DIASTOLIC BLOOD PRESSURE: 70 MMHG | RESPIRATION RATE: 16 BRPM | BODY MASS INDEX: 26.11 KG/M2 | HEIGHT: 60 IN | HEART RATE: 67 BPM

## 2020-03-12 PROCEDURE — 99999 PR OFFICE/OUTPT VISIT,PROCEDURE ONLY: CPT | Performed by: SURGERY

## 2020-03-12 NOTE — PATIENT INSTRUCTIONS
give an initial report after the scope is removed. Other tests may be recommended. A small amount of bleeding may occur during the first few days after the procedure. When you return home after the procedure, be sure to follow your doctor's instructions, which may include:   Resume medicines as instructed by your doctor. Resume normal diet, unless directed otherwise by your doctor. The sedative will make you drowsy. Avoid driving, operating machinery, or making important decisions for the rest of the day. Rest for the remainder of the day. After arriving home, contact your doctor if any of the following occurs:   Bleeding from your rectum, notify your doctor if you pass a teaspoonful of blood or more. Black, tarry stools   Severe abdominal pain   Hard, swollen abdomen   Signs of infection, including fever or chills   Inability to pass gas or stool   Coughing, shortness of breath, chest pain, severe nausea or vomiting     In case of an emergency, CALL 911 . ANGELO COLONOSCOPY PREPARATION    Instructions for Clear liquid diet  Definition  A clear liquid diet consists of clear liquids, such as water, broth and plain gelatin, that are easily digested and leave no undigested residue in your intestinal tract. Your doctor may prescribe a clear liquid diet before certain medical procedures or if you have certain digestive problems. Because a clear liquid diet can't provide you with adequate calories and nutrients, it shouldn't be continued for more than a few days. Purpose  A clear liquid diet is often used before tests, procedures or surgeries that require no food in your stomach or intestines, such as before colonoscopy. It may also be recommended as a short-term diet if you have certain digestive problems, such as nausea, vomiting or diarrhea, or after certain types of surgery.      Diet details  A clear liquid diet helps maintain adequate hydration, provides some important electrolytes, such as sodium and potassium, and gives some energy at a time when a full diet isn't possible or recommended. The following foods are allowed in a clear liquid diet:    Plain water    Fruit juices without pulp, such as apple juice, white grape juice.  Strained lemonade    Clear, fat-free broth (bouillon or consomme)    Clear sodas     Plain gelatin (Not RED or PURPLE)   Honey    Ice pops without bits of fruit or fruit pulp    Tea or coffee without milk or cream   ** NOTHING RED OR PURPLE    Any foods not on the above list should be avoided. Also, for certain tests, such as colon exams, your doctor may ask you to avoid liquids or gelatin with red coloring. A typical menu on the clear liquid diet may look like this:   Breakfast:  1 glass fruit juice  1 cup coffee or tea (without dairy products)  1 cup broth  1 bowl gelatin     Snack:  1 glass fruit juice  1 bowl gelatin     Lunch:  1 glass fruit juice  1 glass water  1 cup broth  1 bowl gelatin     Snack:  1 ice pop (without fruit pulp)  1 cup coffee or tea (without dairy products) or a soft drink     Dinner:  1 cup juice or water  1 cup broth  1 bowl gelatin  1 cup coffee or tea     Purchase this over the counter:  1. GATORADE/Clear liquid (64 ounces)   Pick these up at the pharmacy:  2. DULCOLAX 5 mg tablets (four tablets)  3. MIRALAX BOTTLE 238 grams (over the counter only)    The DAY BEFORE your colonoscopy:   Drink only clear liquids. (Absolutely no solid food)    COLONOSCOPY PREP:  3 PM: Take 2 DULCOLAX tablets    5 PM: Mix the entire bottle of MIRALAX into the 64 ounces of GATORADE. (Put half the bottle in each 32 ounce bottle). Shake the solution until fully dissolved. Drink an 8 ounce glass every 30 minutes until the solution is gone. 7 PM: Take the last 2 DULCOLAX tablets. **DO NOT EAT OR DRINK ANYTHING AFTER MIDNIGHT**          The DAY OF your colonoscopy: You may take any necessary medications with a sip of water.   Bring along someone to take you home. REMEMBER: The preparation is very important. An adequate clean out allows for the best evaluation of your entire colon. During the prep, using baby wipes may ease some of your discomfort. You should NOT plan on working or driving the rest of the day due to sedation given at the procedure    Any Questions or concerns contact April at [phone].

## 2020-03-12 NOTE — PROGRESS NOTES
[facility] Surgery   History and Physical    Patient's Name/Date of Birth: Shankar Flores / 1955 (60 y.o.)      PCP: Veronica Ramirez DO      CC:  c scope    HPI:  60 y.o. female  Here for screening c scope consult. No issues, no GI sx, no wt loss. Not smoker or drinker.       Past Medical History:   Diagnosis Date    Asthma     Atypical ductal hyperplasia of right breast     Depression     Hypothyroidism     Osteopenia        Past Surgical History:   Procedure Laterality Date    APPEN[facility]CTOMY  around [facility]  2009    Sling    BREAST LUMPECTOMY Right 11/6/2019    RIGHT BREAST NEEDLE LOCALIZED LUMPECTOMY -- TRIDENT performed by Dara Vasquez MD at [address] [place] [address] Right 02/25/2016    Cleaned joint right great toe along with bone spurs, Dr. Virgen Johnson  2010    CYST REMOVAL  03/2020    left eye     THYROIDECTOMY  around 1974    Over and underactive, large, small remnant       Family History   Problem Relation Age of Onset    Lung Cancer Father     Cancer Mother 36        skin cancer    Thyroid Disease Brother     Thyroid Disease Sister     No Known Problems Brother     Heart Failure Paternal Aunt     Breast Cancer Paternal Grandmother         age unknown    No Known Problems Daughter     No Known Problems Daughter        Social History     Socioeconomic History    Marital status:      Spouse name: Not on file    Number of children: Not on file    Years of education: Not on file    Highest education level: Not on file   Occupational History    Not on file   Social Needs    Financial resource strain: Not on file    Food insecurity     Worry: Not on file     Inability: Not on file    Transportation needs     Medical: Not on file     Non-medical: Not on file   Tobacco Use    Smoking status: Never Smoker    Smokeless tobacco: Never Used   Substance and Sexual Activity    Alcohol use: No    Drug use: No    Sexual activity: Yes Partners: Male   Lifestyle    Physical activity     Days per week: Not on file     Minutes per session: Not on file    Stress: Not on file   Relationships    Social connections     Talks on phone: Not on file     Gets together: Not on file     Attends [employer] service: Not on file     Active member of club or organization: Not on file     Attends meetings of clubs or organizations: Not on file     Relationship status: Not on file    Intimate partner violence     Fear of current or ex partner: Not on file     Emotionally abused: Not on file     Physically abused: Not on file     Forced sexual activity: Not on file   Other Topics Concern    Not on file   Social History Narrative    Not on file       Current Outpatient Medications   Medication Sig Dispense Refill    SYNTHROID 75 MCG tablet Take 1 tablet by mouth Daily 30 tablet 3    fluticasone (FLONASE) 50 MCG/ACT nasal spray 1 spray by Nasal route daily 1 Bottle 3    albuterol sulfate HFA (VENTOLIN HFA) 108 (90 Base) MCG/ACT inhaler Inhale 1 puff into the lungs every 4 hours as needed for Wheezing OK substitute 1 Inhaler 3    CALCIUM-VITAMIN D PO Take 1 tablet by mouth 2 times daily. Calcium 600 mg  Vit D 1000mg      b complex vitamins tablet Take 1 tablet by mouth daily. No current facility-administered medications for this visit.         Allergies   Allergen Reactions    Pcn [Penicillins] Hives and Itching       REVIEW OF SYSTEMS:    Constitutional: negative   Eyes: negative  Ears, nose, mouth, throat, and face: negative  Respiratory: negative  Cardiovascular: negative  Gastrointestinal: negative  Genitourinary:negative  Integument/breast: negative  Hematologic/lymphatic: negative  Musculoskeletal:negative  Neurological: negative  Allergic/Immunologic: negative    Physical Exam:    @/70 (Site: Left Upper Arm, Position: Sitting, Cuff Size: Small Adult)   Pulse 67   Resp 16   Ht 5' (1.524 m)   Wt 133 lb (60.3 kg)   SpO2 98%   BMI 25.97

## 2020-03-18 ENCOUNTER — TELEPHONE (OUTPATIENT)
Dept: FAMILY MEDICINE CLINIC | Age: 65
End: 2020-03-18

## 2020-03-19 ENCOUNTER — TELEPHONE (OUTPATIENT)
Dept: SURGERY | Age: 65
End: 2020-03-19

## 2020-04-09 RX ORDER — ALBUTEROL SULFATE 90 UG/1
1 AEROSOL, METERED RESPIRATORY (INHALATION) EVERY 4 HOURS PRN
Qty: 1 INHALER | Refills: 3 | Status: SHIPPED
Start: 2020-04-09 | End: 2021-06-07 | Stop reason: SDUPTHER

## 2020-04-30 ENCOUNTER — OFFICE VISIT (OUTPATIENT)
Dept: FAMILY MEDICINE CLINIC | Age: 65
End: 2020-04-30
Payer: COMMERCIAL

## 2020-04-30 VITALS
WEIGHT: 133.8 LBS | RESPIRATION RATE: 16 BRPM | OXYGEN SATURATION: 97 % | BODY MASS INDEX: 26.27 KG/M2 | SYSTOLIC BLOOD PRESSURE: 115 MMHG | HEART RATE: 71 BPM | HEIGHT: 60 IN | DIASTOLIC BLOOD PRESSURE: 80 MMHG | TEMPERATURE: 97.6 F

## 2020-04-30 PROCEDURE — 99214 OFFICE O/P EST MOD 30 MIN: CPT | Performed by: FAMILY MEDICINE

## 2020-04-30 ASSESSMENT — ENCOUNTER SYMPTOMS
SHORTNESS OF BREATH: 0
DIARRHEA: 0
ABDOMINAL PAIN: 0
COUGH: 0
SINUS PRESSURE: 0
EYE PAIN: 0
BACK PAIN: 0
CONSTIPATION: 0
SORE THROAT: 0

## 2020-04-30 NOTE — PROGRESS NOTES
1 point history of cerebrovascular disease (CVA or TIA)   1 point preoperative tx with insulin   1 point preoperative creatinine >2     Score  Risk Index class Risk of major cardiac complications (%)  0  I   0.4  1  II   0.9  2  III   6.6  3 or more IV   11.0    taken from [facility] Volume 85, Number3, p 241      SURGICAL RISK CATEGORY (Bolded line indicates patient's category):  High   Cardiac risk >5% Aortic or other major vascular surgery        Peripheral vascular surgery  Intermediate  Cardiac risk 1-5% Carotid enterectomy        Head and neck surgery        Intraperitoneal or intrathoracic surgery        Orthopedic surgery        Prostate surgery  Low   Cardiac risk <1% Superficial procedures        Breast surgery        Cataract surgery        Endoscopic procedures        Most ambulatory surgeries    taken from  [facility] Volume 85,Number 3, p 241    ROS   Review of Systems   Constitutional: Negative for fatigue and fever. HENT: Negative for ear pain, sinus pressure, sneezing and sore throat. Eyes: Negative for pain. Respiratory: Negative for cough and shortness of breath. Cardiovascular: Negative for chest pain and leg swelling. Gastrointestinal: Negative for abdominal pain, constipation and diarrhea. Genitourinary: Negative for dysuria and urgency. Musculoskeletal: Negative for back pain and myalgias. Skin: Negative for rash. Allergic/Immunologic: Negative for food allergies. Neurological: Negative for light-headedness and headaches. Hematological: Does not bruise/bleed easily. Psychiatric/Behavioral: Negative for behavioral problems and sleep disturbance.        PAST MEDICAL HISTORY:  Patient Active Problem List   Diagnosis    Chronic cough    Osteopenia    Irritability    Hot flashes    Acquired hypothyroidism    Atypical ductal hyperplasia of right breast    Post-op pain       PAST SURGICAL HISTORY:  Past Surgical History:   Procedure Laterality Date    APPENDECTOMY  around 1966 Counseled regarding above diagnosis, including possible risks and complications,  especially if left uncontrolled. Counseled regarding the possible side effects, risks, benefits and alternatives to treatment; patient and/or guardian verbalizes understanding. Advised patient to call with any new medication issues. All questions answered.     Fred 5, DO  4/30/2020

## 2020-05-08 ENCOUNTER — TELEPHONE (OUTPATIENT)
Dept: FAMILY MEDICINE CLINIC | Age: 65
End: 2020-05-08

## 2020-06-17 RX ORDER — LEVOTHYROXINE SODIUM 75 MCG
75 TABLET ORAL DAILY
Qty: 30 TABLET | Refills: 3 | Status: SHIPPED
Start: 2020-06-17 | End: 2020-10-27 | Stop reason: SDUPTHER

## 2020-09-29 ENCOUNTER — HOSPITAL ENCOUNTER (OUTPATIENT)
Dept: GENERAL RADIOLOGY | Age: 65
Discharge: HOME | End: 2020-10-01
Payer: COMMERCIAL

## 2020-09-29 PROCEDURE — 77063 BREAST TOMOSYNTHESIS BI: CPT

## 2020-10-27 ENCOUNTER — NURSE ONLY (OUTPATIENT)
Dept: FAMILY MEDICINE CLINIC | Age: 65
End: 2020-10-27
Payer: COMMERCIAL

## 2020-10-27 VITALS — TEMPERATURE: 97.5 F

## 2020-10-27 PROCEDURE — 90686 IIV4 VACC NO PRSV 0.5 ML IM: CPT | Performed by: FAMILY MEDICINE

## 2020-10-27 PROCEDURE — 90471 IMMUNIZATION ADMIN: CPT | Performed by: FAMILY MEDICINE

## 2020-10-27 RX ORDER — LEVOTHYROXINE SODIUM 75 MCG
75 TABLET ORAL DAILY
Qty: 30 TABLET | Refills: 3 | Status: SHIPPED
Start: 2020-10-27 | End: 2021-02-18 | Stop reason: SDUPTHER

## 2020-12-08 ENCOUNTER — HOSPITAL ENCOUNTER (OUTPATIENT)
Age: 65
Discharge: HOME | End: 2020-12-08
Payer: COMMERCIAL

## 2020-12-08 LAB
CREAT SERPL-MCNC: 0.9 MG/DL (ref 0.5–1)
GFR AFRICAN AMERICAN: >60
GFR NON-AFRICAN AMERICAN: >60 ML/MIN/1.73

## 2020-12-08 PROCEDURE — 36415 COLL VENOUS BLD VENIPUNCTURE: CPT

## 2020-12-08 PROCEDURE — 82565 ASSAY OF CREATININE: CPT

## 2020-12-29 ENCOUNTER — HOSPITAL ENCOUNTER (OUTPATIENT)
Dept: MRI IMAGING | Age: 65
Discharge: HOME | End: 2020-12-31
Payer: COMMERCIAL

## 2020-12-29 DIAGNOSIS — Z91.89 AT HIGH RISK FOR BREAST CANCER: ICD-10-CM

## 2020-12-29 PROCEDURE — 77049 MRI BREAST C-+ W/CAD BI: CPT

## 2020-12-29 PROCEDURE — 6360000004 HC RX CONTRAST MEDICATION: Performed by: RADIOLOGY

## 2020-12-29 PROCEDURE — A9577 INJ MULTIHANCE: HCPCS | Performed by: RADIOLOGY

## 2020-12-29 RX ADMIN — GADOBENATE DIMEGLUMINE 11 ML: 529 INJECTION, SOLUTION INTRAVENOUS at 08:39

## 2021-01-21 ENCOUNTER — TELEPHONE (OUTPATIENT)
Dept: FAMILY MEDICINE CLINIC | Age: 66
End: 2021-01-21

## 2021-01-21 NOTE — TELEPHONE ENCOUNTER
Patient wants to get a mole removed. Does she need to schedule a consultation with you before scheduling to have it removed here? To see if you are able to remove it without having to refer her out.

## 2021-02-18 ENCOUNTER — OFFICE VISIT (OUTPATIENT)
Dept: FAMILY MEDICINE CLINIC | Age: 66
End: 2021-02-18
Payer: COMMERCIAL

## 2021-02-18 VITALS
WEIGHT: 130 LBS | TEMPERATURE: 97.7 F | HEIGHT: 60 IN | DIASTOLIC BLOOD PRESSURE: 66 MMHG | RESPIRATION RATE: 16 BRPM | HEART RATE: 69 BPM | SYSTOLIC BLOOD PRESSURE: 92 MMHG | OXYGEN SATURATION: 98 % | BODY MASS INDEX: 25.52 KG/M2

## 2021-02-18 DIAGNOSIS — E03.9 ACQUIRED HYPOTHYROIDISM: ICD-10-CM

## 2021-02-18 DIAGNOSIS — M54.50 LOW BACK PAIN, UNSPECIFIED BACK PAIN LATERALITY, UNSPECIFIED CHRONICITY, UNSPECIFIED WHETHER SCIATICA PRESENT: Primary | ICD-10-CM

## 2021-02-18 DIAGNOSIS — L98.9 CHANGING SKIN LESION: ICD-10-CM

## 2021-02-18 LAB
ALBUMIN SERPL-MCNC: 4.3 G/DL (ref 3.5–5.2)
ALP BLD-CCNC: 102 U/L (ref 35–104)
ALT SERPL-CCNC: 17 U/L (ref 0–32)
ANION GAP SERPL CALCULATED.3IONS-SCNC: 11 MMOL/L (ref 7–16)
AST SERPL-CCNC: 24 U/L (ref 0–31)
BASOPHILS ABSOLUTE: 0.06 E9/L (ref 0–0.2)
BASOPHILS RELATIVE PERCENT: 1.1 % (ref 0–2)
BILIRUB SERPL-MCNC: 0.3 MG/DL (ref 0–1.2)
BILIRUBIN, POC: NEGATIVE
BLOOD URINE, POC: NORMAL
BUN BLDV-MCNC: 11 MG/DL (ref 8–23)
CALCIUM SERPL-MCNC: 8.4 MG/DL (ref 8.6–10.2)
CHLORIDE BLD-SCNC: 104 MMOL/L (ref 98–107)
CLARITY, POC: NORMAL
CO2: 26 MMOL/L (ref 22–29)
COLOR, POC: YELLOW
CREAT SERPL-MCNC: 0.9 MG/DL (ref 0.5–1)
EOSINOPHILS ABSOLUTE: 0.25 E9/L (ref 0.05–0.5)
EOSINOPHILS RELATIVE PERCENT: 4.4 % (ref 0–6)
GFR AFRICAN AMERICAN: >60
GFR NON-AFRICAN AMERICAN: >60 ML/MIN/1.73
GLUCOSE BLD-MCNC: 89 MG/DL (ref 74–99)
GLUCOSE URINE, POC: NEGATIVE
HCT VFR BLD CALC: 39.1 % (ref 34–48)
HEMOGLOBIN: 12.6 G/DL (ref 11.5–15.5)
IMMATURE GRANULOCYTES #: 0.02 E9/L
IMMATURE GRANULOCYTES %: 0.4 % (ref 0–5)
KETONES, POC: NEGATIVE
LEUKOCYTE EST, POC: NEGATIVE
LYMPHOCYTES ABSOLUTE: 1.62 E9/L (ref 1.5–4)
LYMPHOCYTES RELATIVE PERCENT: 28.5 % (ref 20–42)
MCH RBC QN AUTO: 29.3 PG (ref 26–35)
MCHC RBC AUTO-ENTMCNC: 32.2 % (ref 32–34.5)
MCV RBC AUTO: 90.9 FL (ref 80–99.9)
MONOCYTES ABSOLUTE: 0.44 E9/L (ref 0.1–0.95)
MONOCYTES RELATIVE PERCENT: 7.7 % (ref 2–12)
NEUTROPHILS ABSOLUTE: 3.29 E9/L (ref 1.8–7.3)
NEUTROPHILS RELATIVE PERCENT: 57.9 % (ref 43–80)
NITRITE, POC: NEGATIVE
PDW BLD-RTO: 13.3 FL (ref 11.5–15)
PH, POC: 5.5
PLATELET # BLD: 288 E9/L (ref 130–450)
PMV BLD AUTO: 11.5 FL (ref 7–12)
POTASSIUM SERPL-SCNC: 3.9 MMOL/L (ref 3.5–5)
PROTEIN, POC: NEGATIVE
RBC # BLD: 4.3 E12/L (ref 3.5–5.5)
SODIUM BLD-SCNC: 141 MMOL/L (ref 132–146)
SPECIFIC GRAVITY, POC: 1.01
T4 FREE: 1.42 NG/DL (ref 0.93–1.7)
TOTAL PROTEIN: 6.8 G/DL (ref 6.4–8.3)
TSH SERPL DL<=0.05 MIU/L-ACNC: 0.58 UIU/ML (ref 0.27–4.2)
UROBILINOGEN, POC: 0.2
WBC # BLD: 5.7 E9/L (ref 4.5–11.5)

## 2021-02-18 PROCEDURE — 99214 OFFICE O/P EST MOD 30 MIN: CPT | Performed by: FAMILY MEDICINE

## 2021-02-18 PROCEDURE — 81002 URINALYSIS NONAUTO W/O SCOPE: CPT | Performed by: FAMILY MEDICINE

## 2021-02-18 RX ORDER — LEVOTHYROXINE SODIUM 75 MCG
75 TABLET ORAL DAILY
Qty: 30 TABLET | Refills: 5 | Status: SHIPPED
Start: 2021-02-18 | End: 2021-03-01

## 2021-02-18 RX ORDER — CYCLOBENZAPRINE HCL 5 MG
5 TABLET ORAL NIGHTLY PRN
Qty: 30 TABLET | Refills: 0 | Status: SHIPPED
Start: 2021-02-18 | End: 2022-01-25

## 2021-02-18 ASSESSMENT — PATIENT HEALTH QUESTIONNAIRE - PHQ9
SUM OF ALL RESPONSES TO PHQ QUESTIONS 1-9: 0
2. FEELING DOWN, DEPRESSED OR HOPELESS: 0
1. LITTLE INTEREST OR PLEASURE IN DOING THINGS: 0
SUM OF ALL RESPONSES TO PHQ QUESTIONS 1-9: 0

## 2021-02-18 NOTE — PROGRESS NOTES
Pain:  Patient is here today with complaints of lower back pain. This is a/an chronic problem. This has been going on for a couple years. She denies any trauma or injury. She states pain is now more constant. Exacerbating factors include sleeping at night or walking a lot at work. Alleviating factors include heating pad. Pain is sharp in nature. 8-10/10. Pain is  radiating. This has happen to patient before. Imaging to date includes no recent x-rays. Therapy to date includes none. Has never done therapy. Hypothyroidism:  Patient is here today to follow up chronic hypothyroidism. This problem is longstanding. This is   generally controlled on current medication regimen, Levothyroxine 75 mcg. Takes medication as directed and tolerates well. Symptoms from thyroid standpoint include no symptoms. Denies fatigue, changes in skin, hair or nails, unintentional weight loss or gain. Most recent labs reviewed with patient and are not remarkable. Additional testing, including thyroid ultrasound and thyroid antibodies has been done in the past. Patient does not have exposure to radiation and/or iodine in the past.    Patient has a mole on her neck and would like to discuss removal options. She states that it has been there forever. She states that it has not gotten any bigger. It has not changed shape but is getting darker in color. Her mother had skin cancer but she is unsure of what kind. Patient's past medical, surgical, social and/or family history reviewed, updated in chart, and are non-contributory (unless otherwise stated). Medications and allergies also reviewed and updated in chart.       Review of Systems:  Constitutional:  No fever, no fatigue, no chills, no headaches, no weight change  Dermatology:  No rash, + mole, no dry or sensitive skin  ENT:  No cough, no sore throat, no sinus pain, no runny nose, no ear pain Cardiology:  No chest pain, no palpitations, no leg edema, no shortness of breath, no PND  Gastroenterology:  No dysphagia, no abdominal pain, no nausea, no vomiting, no constipation, no diarrhea, no heartburn  Musculoskeletal:  No joint pain, no leg cramps, + back pain, no muscle aches  Respiratory:  No shortness of breath, no orthopnea, no wheezing, no MURILLO, no hemoptysis  Urology:  No blood in the urine, no urinary frequency, no urinary incontinence, no urinary urgency, no nocturia, no dysuria      Vitals:    02/18/21 1232   BP: 92/66   Pulse: 69   Resp: 16   Temp: 97.7 °F (36.5 °C)   TempSrc: Infrared   SpO2: 98%   Weight: 130 lb (59 kg)   Height: 5' (1.524 m)       Physical Exam  Vitals signs and nursing note reviewed. Constitutional:       Appearance: She is well-developed. HENT:      Head: Normocephalic and atraumatic. Right Ear: External ear normal.      Left Ear: External ear normal.      Nose: Nose normal.   Eyes:      Conjunctiva/sclera: Conjunctivae normal.      Pupils: Pupils are equal, round, and reactive to light. Neck:      Musculoskeletal: Normal range of motion and neck supple. Thyroid: No thyromegaly. Cardiovascular:      Rate and Rhythm: Normal rate and regular rhythm. Heart sounds: Normal heart sounds. Pulmonary:      Effort: Pulmonary effort is normal.      Breath sounds: Normal breath sounds. No wheezing. Abdominal:      General: Bowel sounds are normal.      Palpations: Abdomen is soft. Tenderness: There is no abdominal tenderness. Musculoskeletal:      Lumbar back: She exhibits decreased range of motion, tenderness, pain and spasm. Skin:     General: Skin is warm and dry. Findings: Lesion (0.7 cm by 0.5 dark skin lesion) present. No rash. Neurological:      Mental Status: She is alert and oriented to person, place, and time. Deep Tendon Reflexes: Reflexes are normal and symmetric.    Psychiatric:         Behavior: Behavior normal. Assessment/Plan:      Haylie Siddiqui was seen today for back pain and hypothyroidism. Diagnoses and all orders for this visit:    Low back pain, unspecified back pain laterality, unspecified chronicity, unspecified whether sciatica present  -     POCT Urinalysis no Micro  -     XR LUMBAR SPINE (MIN 4 VIEWS); Future  -     POCT Urinalysis no Micro  -     Amb External Referral To Physical Therapy  -     cyclobenzaprine (FLEXERIL) 5 MG tablet; Take 1 tablet by mouth nightly as needed for Muscle spasms  Unable to leave urine in the office  Cup given for her to return. X-ray ordered  Will start flexeril  Side effects reviewed  Referral to PT    Acquired hypothyroidism  -     CBC Auto Differential; Future  -     Comprehensive Metabolic Panel; Future  -     TSH without Reflex; Future  -     T4, Free; Future  -     SYNTHROID 75 MCG tablet; Take 1 tablet by mouth Daily  Labs ordered  Continue current dose of synthroid    Changing skin lesion  -     Facundo Alcocer MD, Plastic & Reconstructive Surgery, [place]  Referral to Dr. Wilberto Huber    As above. Call or go to ED immediately if symptoms worsen or persist.  Return in about 4 weeks (around 3/18/2021). , or sooner if necessary. Educational materials and/or home exercises printed for patient's review and were included in patient instructions on his/her After Visit Summary and given to patient at the end of visit. Counseled regarding above diagnosis, including possible risks and complications,  especially if left uncontrolled. Counseled regarding the possible side effects, risks, benefits and alternatives to treatment; patient and/or guardian verbalizes understanding, agrees, feels comfortable with and wishes to proceed with above treatment plan. Advised patient to call with any new medication issues, and read all Rx info from pharmacy to assure aware of all possible risks and side effects of medication before taking. Reviewed age and gender appropriate health screening exams and vaccinations. Advised patient regarding importance of keeping up with recommended health maintenance and to schedule as soon as possible if overdue, as this is important in assessing for undiagnosed pathology, especially cancer, as well as protecting against potentially harmful/life threatening disease. Patient and/or guardian verbalizes understanding and agrees with above counseling, assessment and plan. All questions answered. Megan Jacob DO  2/18/2021    I have personally reviewed and updated the chief complaint, HPI, Past Medical, Family and Social History, as well as the above Review of Systems.

## 2021-02-18 NOTE — PATIENT INSTRUCTIONS
Patient Education        cyclobenzaprine  Pronunciation:  osiel nina TOYA za preen  Brand:  Amrix, Comfort Pac with Cyclobenzaprine, Fexmid  What is the most important information I should know about cyclobenzaprine? You should not use cyclobenzaprine if you have a thyroid disorder, heart block, congestive heart failure, a heart rhythm disorder, or you have recently had a heart attack. Do not use cyclobenzaprine if you have taken an MAO inhibitor in the past 14 days, such as isocarboxazid, linezolid, phenelzine, rasagiline, selegiline, or tranylcypromine. What is cyclobenzaprine? Cyclobenzaprine is a muscle relaxant. It works by blocking nerve impulses (or pain sensations) that are sent to your brain. Cyclobenzaprine is used together with rest and physical therapy to relieve muscle spasms caused by painful conditions such as an injury. Cyclobenzaprine may also be used for purposes not listed in this medication guide. What should I discuss with my healthcare provider before taking cyclobenzaprine? You should not use cyclobenzaprine if you are allergic to it, or if you have:  · a thyroid disorder;  · heart block, heart rhythm disorder, congestive heart failure; or  · if you have recently had a heart attack. Cyclobenzaprine is not approved for use by anyone younger than 13years old. Do not use cyclobenzaprine if you have taken an MAO inhibitor in the past 14 days. A dangerous drug interaction could occur. MAO inhibitors include isocarboxazid, linezolid, phenelzine, rasagiline, selegiline, and tranylcypromine. Some medicines can interact with cyclobenzaprine and cause a serious condition called serotonin syndrome. Be sure your doctor knows if you also take stimulant medicine, opioid medicine, herbal products, or medicine for depression, mental illness, Parkinson's disease, migraine headaches, serious infections, or prevention of nausea and vomiting. Ask your doctor before making any changes in how or when you take your medications. Tell your doctor if you have ever had:  · liver disease;  · glaucoma;  · enlarged prostate; or  · problems with urination. It is not known whether this medicine will harm an unborn baby. Tell your doctor if you are pregnant or plan to become pregnant. It may not be safe to breast-feed while using this medicine. Ask your doctor about any risk. Older adults may be more sensitive to the effects of this medicine. How should I take cyclobenzaprine? Follow all directions on your prescription label and read all medication guides or instruction sheets. Your doctor may occasionally change your dose. Use the medicine exactly as directed. Cyclobenzaprine is usually taken once daily for only 2 or 3 weeks. Follow your doctor's dosing instructions very carefully. Swallow the capsule whole and do not crush, chew, break, or open it. Take the medicine at the same time each day. Call your doctor if your symptoms do not improve after 3 weeks, or if they get worse. Store at room temperature away from moisture, heat, and light. What happens if I miss a dose? Take the medicine as soon as you can, but skip the missed dose if it is almost time for your next dose. Do not take two doses at one time. What happens if I overdose? Seek emergency medical attention or call the Poison Help line at 1-934.324.1463. An overdose of cyclobenzaprine can be fatal.  Overdose symptoms may include severe drowsiness, vomiting, fast heartbeats, tremors, agitation, or hallucinations. What should I avoid while taking cyclobenzaprine? Avoid driving or hazardous activity until you know how this medicine will affect you. Your reactions could be impaired. Avoid drinking alcohol. Dangerous side effects could occur. What are the possible side effects of cyclobenzaprine? Get emergency medical help if you have signs of an allergic reaction: hives; difficult breathing; swelling of your face, lips, tongue, or throat. Stop using cyclobenzaprine and call your doctor at once if you have:  · fast or irregular heartbeats;  · chest pain or pressure, pain spreading to your jaw or shoulder; or  · sudden numbness or weakness (especially on one side of the body), slurred speech, balance problems. Seek medical attention right away if you have symptoms of serotonin syndrome, such as: agitation, hallucinations, fever, sweating, shivering, fast heart rate, muscle stiffness, twitching, loss of coordination, nausea, vomiting, or diarrhea. Serious side effects may be more likely in older adults. Common side effects may include:  · drowsiness, tiredness;  · headache, dizziness;  · dry mouth; or  · upset stomach, nausea, constipation. This is not a complete list of side effects and others may occur. Call your doctor for medical advice about side effects. You may report side effects to FDA at 4-876-FDA-4234. What other drugs will affect cyclobenzaprine? Using cyclobenzaprine with other drugs that make you drowsy can worsen this effect. Ask your doctor before using opioid medication, a sleeping pill, a muscle relaxer, or medicine for anxiety or seizures.   Tell your doctor about all your other medicines, especially:  · bupropion (Zyban, for smoking cessation);  · meperidine;  · tramadol;  · verapamil;  · cold or allergy medicine that contains an antihistamine (Benadryl and others);  · medicine to treat Parkinson's disease; · medicine to treat excess stomach acid, stomach ulcer, motion sickness, or irritable bowel syndrome;  · medicine to treat overactive bladder; or  · bronchodilator asthma medication. This list is not complete. Other drugs may affect cyclobenzaprine, including prescription and over-the-counter medicines, vitamins, and herbal products. Not all possible drug interactions are listed here. Where can I get more information? Your pharmacist can provide more information about cyclobenzaprine. Remember, keep this and all other medicines out of the reach of children, never share your medicines with others, and use this medication only for the indication prescribed. Every effort has been made to ensure that the information provided by [address] is accurate, up-to-date, and complete, but no guarantee is made to that effect. Drug information contained herein may be time sensitive. [facility] information has been compiled for use by healthcare practitioners and consumers in the [place] and therefore [facility] does not warrant that uses outside of the [place] are appropriate, unless specifically indicated otherwise. [facility]'s drug information does not endorse drugs, diagnose patients or recommend therapy. [facility]'s drug information is an informational resource designed to assist licensed healthcare practitioners in caring for their patients and/or to serve consumers viewing this service as a supplement to, and not a substitute for, the expertise, skill, knowledge and judgment of healthcare practitioners. The absence of a warning for a given drug or drug combination in no way should be construed to indicate that the drug or drug combination is safe, effective or appropriate for any given patient. [facility] does not assume any responsibility for any aspect of healthcare administered with the aid of information [facility] provides. The information contained herein is not intended to cover all possible uses, directions, precautions, warnings, drug interactions, allergic reactions, or adverse effects. If you have questions about the drugs you are taking, check with your doctor, nurse or pharmacist.  Copyright 4468-5674 [address]. Version: 5.01. Revision date: 9/26/2018. Care instructions adapted under license by [facility] [place] ([facility]). If you have questions about a medical condition or this instruction, always ask your healthcare professional. [place] [address] any warranty or liability for your use of this information.

## 2021-02-19 DIAGNOSIS — M54.50 LOW BACK PAIN, UNSPECIFIED BACK PAIN LATERALITY, UNSPECIFIED CHRONICITY, UNSPECIFIED WHETHER SCIATICA PRESENT: ICD-10-CM

## 2021-02-26 ENCOUNTER — TELEPHONE (OUTPATIENT)
Dept: FAMILY MEDICINE CLINIC | Age: 66
End: 2021-02-26

## 2021-03-01 DIAGNOSIS — E03.9 ACQUIRED HYPOTHYROIDISM: ICD-10-CM

## 2021-03-01 RX ORDER — LEVOTHYROXINE SODIUM 75 MCG
75 TABLET ORAL DAILY
Qty: 30 TABLET | Refills: 5 | Status: SHIPPED
Start: 2021-03-01 | End: 2021-10-18 | Stop reason: SDUPTHER

## 2021-03-01 NOTE — TELEPHONE ENCOUNTER
Urine showed trace blood. Can she stop in this week for a repeat UA? X-ray showed mild arthritis and ostepenia. Please make sure she is taking calcium and vitamin d. Have symptoms improved at all?

## 2021-03-02 ENCOUNTER — TELEPHONE (OUTPATIENT)
Dept: FAMILY MEDICINE CLINIC | Age: 66
End: 2021-03-02

## 2021-03-02 DIAGNOSIS — R31.9 HEMATURIA, UNSPECIFIED TYPE: Primary | ICD-10-CM

## 2021-03-02 NOTE — TELEPHONE ENCOUNTER
patient called in and said she has been having lower back pain and she will get a tingling going down her left leg.

## 2021-03-04 ENCOUNTER — NURSE ONLY (OUTPATIENT)
Dept: FAMILY MEDICINE CLINIC | Age: 66
End: 2021-03-04
Payer: COMMERCIAL

## 2021-03-04 DIAGNOSIS — R31.9 HEMATURIA, UNSPECIFIED TYPE: Primary | ICD-10-CM

## 2021-03-04 LAB
BILIRUBIN, POC: NORMAL
BLOOD URINE, POC: NORMAL
CLARITY, POC: CLEAR
COLOR, POC: YELLOW
GLUCOSE URINE, POC: NORMAL
KETONES, POC: NORMAL
LEUKOCYTE EST, POC: NORMAL
NITRITE, POC: NORMAL
PH, POC: 5.5
PROTEIN, POC: NORMAL
SPECIFIC GRAVITY, POC: 1.01
UROBILINOGEN, POC: 0.2

## 2021-03-04 PROCEDURE — 81002 URINALYSIS NONAUTO W/O SCOPE: CPT | Performed by: FAMILY MEDICINE

## 2021-03-04 NOTE — TELEPHONE ENCOUNTER
She has not started PT yet she was awaiting result she is going to call to set up appt today. Patient is taking flexeril.

## 2021-03-08 ENCOUNTER — HOSPITAL ENCOUNTER (OUTPATIENT)
Age: 66
Discharge: HOME | End: 2021-03-08
Payer: COMMERCIAL

## 2021-03-08 DIAGNOSIS — R31.9 HEMATURIA, UNSPECIFIED TYPE: ICD-10-CM

## 2021-03-08 PROCEDURE — 87088 URINE BACTERIA CULTURE: CPT

## 2021-03-10 LAB — URINE CULTURE, ROUTINE: NORMAL

## 2021-03-11 ENCOUNTER — OFFICE VISIT (OUTPATIENT)
Dept: SURGERY | Age: 66
End: 2021-03-11
Payer: COMMERCIAL

## 2021-03-11 VITALS
OXYGEN SATURATION: 97 % | HEIGHT: 63 IN | BODY MASS INDEX: 23.04 KG/M2 | TEMPERATURE: 97.3 F | DIASTOLIC BLOOD PRESSURE: 72 MMHG | WEIGHT: 130 LBS | SYSTOLIC BLOOD PRESSURE: 128 MMHG | HEART RATE: 73 BPM

## 2021-03-11 DIAGNOSIS — L98.9 SKIN LESION: Primary | ICD-10-CM

## 2021-03-11 PROCEDURE — 99203 OFFICE O/P NEW LOW 30 MIN: CPT | Performed by: PHYSICIAN ASSISTANT

## 2021-03-11 PROCEDURE — 11103 TANGNTL BX SKIN EA SEP/ADDL: CPT | Performed by: PHYSICIAN ASSISTANT

## 2021-03-11 PROCEDURE — 11102 TANGNTL BX SKIN SINGLE LES: CPT | Performed by: PHYSICIAN ASSISTANT

## 2021-03-11 RX ORDER — LIDOCAINE HYDROCHLORIDE AND EPINEPHRINE 10; 10 MG/ML; UG/ML
3 INJECTION, SOLUTION INFILTRATION; PERINEURAL ONCE
Status: COMPLETED | OUTPATIENT
Start: 2021-03-11 | End: 2021-03-11

## 2021-03-11 RX ADMIN — LIDOCAINE HYDROCHLORIDE AND EPINEPHRINE 3 ML: 10; 10 INJECTION, SOLUTION INFILTRATION; PERINEURAL at 08:35

## 2021-03-11 NOTE — PROGRESS NOTES
Department of Plastic Surgery - Adult  Attending Consult Note      CHIEF COMPLAINT:  Lesion of chest and back     History Obtained From:  patient    HISTORY OF PRESENT ILLNESS:                The patient is a 73 y.o. female who presents with concerns of 3 lesions that have grown in size. The first lesion she brings my attention is a left lateral neck lesion which he states has grown in size had increased darker pigment and elevation. She states that this lesion will get caught on her jewelry and clothing. She has never had this area biopsied. Next lesion she brings to my attention is a left upper chest elevated bright red lesion which she states will get caught on clothing and have bleeding. She has never had this area biopsied last she brings to my attention a right upper back lesion which is elevated and becoming scabbing and crusting in appearance which she states also gets caught on her bra strap and will pull out her skin.           Past Medical History:    Past Medical History:   Diagnosis Date    Asthma     Atypical ductal hyperplasia of right breast     Depression     Hypothyroidism     Osteopenia      Past Surgical History:    Past Surgical History:   Procedure Laterality Date    APPENDECTOMY  around [facility]  2009    Sling    BREAST LUMPECTOMY Right 11/6/2019    RIGHT BREAST NEEDLE LOCALIZED LUMPECTOMY -- TRIDENT performed by Ania Jolley MD at [address] [place] [address] Right 02/25/2016    Cleaned joint right great toe along with bone spurs, Dr. Frances Harden  2010    CYST REMOVAL  03/2020    left eye     THYROIDECTOMY  around 1974    Over and underactive, large, small remnant     Current Medications:      Current Outpatient Medications   Medication Sig Dispense Refill    SYNTHROID 75 MCG tablet TAKE 1 TABLET BY MOUTH DAILY 30 tablet 5    cyclobenzaprine (FLEXERIL) 5 MG tablet Take 1 tablet by mouth nightly as needed for Muscle spasms 30 tablet 0    albuterol Problem Relation Age of Onset    Lung Cancer Father     Cancer Mother 36        skin cancer    Thyroid Disease Brother     Thyroid Disease Sister     No Known Problems Brother     Heart Failure Paternal Aunt     Breast Cancer Paternal Grandmother         age unknown    No Known Problems Daughter     No Known Problems Daughter        REVIEW OF SYSTEMS:    CONSTITUTIONAL:  negative for  fevers, chills, sweats and fatigue  EYES: negative for dipolpia or acute vision loss. RESPIRATORY:  negative for  dry cough, cough with sputum, dyspnea, wheezing and chest pain  HENT:negative for pain, headache, difficulty swallowing or nose bleeds. CARDIOVASCULAR:  negative for  chest pain, dyspnea, palpitations, syncope  GASTROINTESTINAL:  negative for nausea, vomiting, change in bowel habits, diarrhea, constipation and abdominal pain  EXTREMITIES: negative for edema  MUSCULOSKELETAL: negative for muscle weakness  SKIN: positive for lesion,negative for itching or rashes. HEME: negative for easy brusing or bleeding  BEHAVIOR/PSYCH:  negative for poor appetite, increased appetite, decreased sleep and poor concentration      PHYSICAL EXAM:        VITALS:  /72 (Site: Right Upper Arm, Position: Sitting, Cuff Size: Medium Adult)   Pulse 73   Temp 97.3 °F (36.3 °C) (Temporal)   Ht 5' 3\" (1.6 m)   Wt 130 lb (59 kg)   SpO2 97%   BMI 23.03 kg/m²   CONSTITUTIONAL:  awake, alert, cooperative, no apparent distress, and appears stated age  EYES: PERRLA, EOMI, no signs of occular infection  LUNGS:  No increased work of breathing, good air exchange  CARDIOVASCULAR:  regular rate and rhythm   EXTREMITIES: no signs of clubbing or cyanosis. MUSCULOSKELETAL: negative for flaccid muscle tone or spastic movements. NEURO: Cranial nerves II-XII grossly intact. No signs of agitated mood.      SKIN: Left lateral neck elevated pigmented lesion-  6mm x 6mm,  brown in color, irregular border, raised, no signs of bleeding,drainage or infection. Non tender to palpation. SKIN: Left upper chest near clavicle elevated pigmented lesion-  2mm x 2mm,  Bright red in color, irregular border, raised, no signs of bleeding,drainage or infection. Non tender to palpation. SKIN: Right upper back scabbing crusting lesion-  8mm x 4mm,  Light brown in color, irregular border, raised, no signs of bleeding,drainage or infection. Non tender to palpation. DATA:    Labs: CBC:   Lab Results   Component Value Date    WBC 5.7 02/18/2021    RBC 4.30 02/18/2021    HGB 12.6 02/18/2021    HCT 39.1 02/18/2021    MCV 90.9 02/18/2021    MCH 29.3 02/18/2021    MCHC 32.2 02/18/2021    RDW 13.3 02/18/2021     02/18/2021    MPV 11.5 02/18/2021     BMP:    Lab Results   Component Value Date     02/18/2021    K 3.9 02/18/2021     02/18/2021    CO2 26 02/18/2021    BUN 11 02/18/2021    LABALBU 4.3 02/18/2021    CREATININE 0.9 02/18/2021    CALCIUM 8.4 02/18/2021    GFRAA >60 02/18/2021    LABGLOM >60 02/18/2021    GLUCOSE 89 02/18/2021       Radiology Review:  No radiology needed at this time  Pathology Review: No Pathology reviewed       IMPRESSION/RECOMMENDATIONS:        Diagnosis  -) Suspicious lesion of right upper back, left lateral neck and left upper chest    -We will plan to proceed and have the Suspicious lesion of right upper back, left lateral neck and left upper chest   biopsied.    -The risks, benefits and options were discussed with the pt. The risks included but not limited to pain, bleeding, infection, heavy scarring, damage to surrounding structures, fluid collections, asymmetry, and need for further procedures. All of Her questions were answered to their satisfaction and She agrees to proceed with the procedure.    -After consent was obtained, the area was cleansed with an alcohol swab. Local anesthesia consisting of 1% lidocaine with 1:100,000 epinepherine was injected  surrounding the area. The local was allowed to work.   Using a 10-blade the lesion was shaved, hemostasis was obtained and a bandage was placed. The procedure was performed by Nguyen Narayan      I have discussed with the patient that they should make every attempt to follow-up within this time interval in the event that the pathology or radiographic findings require further attention such as surgical or other medical intervention. They voiced complete understanding. The patient was educated to keep the bandage in place and apply bacitracin to the wound site BID. OK to shower at this time. Advised the patient that they can allow soap and water to rinse of the wound site while showering. Once they are done in the shower they are to pat dry the incision site with a clean paper towel. No baths, hot tubs or soaking of the wound site at this time. Pt voices understanding.      Photos obtained    FU- 7-14 days    Nguyen Narayan

## 2021-03-26 ENCOUNTER — VIRTUAL VISIT (OUTPATIENT)
Dept: SURGERY | Age: 66
End: 2021-03-26

## 2021-03-26 DIAGNOSIS — D18.01 HEMANGIOMA OF SKIN: ICD-10-CM

## 2021-03-26 DIAGNOSIS — L82.1 SK (SEBORRHEIC KERATOSIS): Primary | ICD-10-CM

## 2021-03-26 NOTE — PROGRESS NOTES
Alejandra Tovar is a 73 y.o. female evaluated via telephone on 3/26/2021. Consent:  She and/or health care decision maker is aware that that she may receive a bill for this telephone service, depending on her insurance coverage, and has provided verbal consent to proceed: Yes      Documentation:  I communicated with the patient and/or health care decision maker about her pathology results. Details of this discussion including any medical advice provided:     [facility]       [address]      [facility] 27     1700 [facility]            [address] [facility]                                                   [address], [address] [place], 1200 [facility], [address] [place] [address]               FINAL SURGICAL PATHOLOGY REPORT     NAME:           Kristian Albert     Date of       03/11/2021                                            Collection:   Medical Record   [id number]              Date of       03/12/2021   Number:                                  Receipt:   Age:  68 Y        Sex:  F                Date          03/16/2021 16:21                                            Reported:   Date Of Birth:   1955   Financial        [id number]            Admitting     OBED GARCIA   Number:                                  Physician:   Patient          HEWPL                   Ordering      [facility]   Location:                                Physician:    JANA     Accession Number:  HES-                                           Additional Physicians:ASHKAN KO       Diagnosis:   A.  Skin, left neck:   Pigmented seborrheic keratosis with mild subepithelial chronic   inflammation, incompletely excised. Negative for malignancy.      Ojeda Darner, left chest:   Polypoid skin with Pt received at shift change decompensating. HR irregular, bradying down to 30's-40's BP dropping. JENNY Gale PA-C in unit. Per verbal order atropine administered at 1926. HR improved to 40's-50's then back to 30's. Second dose of atropine administered per TOV from Dr. De Leon at 1942. No improvement observed. 0.5mg of epi and one amp of bicarb administered per TOV from Dr. De Leon at 1944, continuous epi gtt ordered at this time per TOV from Dr. De Leon to maintain HR >/= 50, at this time Dr. De Leon states she will be to unit shortly. Remaining half amp of epi administered at 2000 for HR of 37. No improvement observed, 1 amp of epi given at 2005. Dopamine ordered per Dr. Ness. Dopamine pulled from code cart and started at 2011 at 5mcg/kg/min and titrated for HR >/= 60. Epi gtt and bicarb gtt received from pharmacy and started. Pressors titrated accordingly. 2100 Dr. De Leon at bedside for assessment. Per Dr. De Leon try to titrate Dopamine off and titrate Levo and Epi gtts up. 2113 1 g Calcium pulled from code cart and administered per verbal from Dr. De Leon. Dr. De Leon updating family on pt poor prognosis. 2200 Family members at bedside. Epi and Levo maxxed out and unable to titrate Dopamine off. Pt made DNR at this time after visitation and speaking with Dr. De Leon. Pt noted to be in asystole at 2259. Dr. Villarreal from ED to bedside to pronounce. MIREYA and  notified of death. Pt not candidate for donation and released by . Pt taken to [facility] at 0115   dermal capillary hemangioma. Patrice Castellon, right upper back:   Abundant verrucous keratotic debris and focal benign superficial   papillomatous squamous epithelium with cautery-like artifact,   insufficient for further diagnosis, see comment. [address]       [place] [address] [place] [address] 27     1700 [facility]            [address]                                                   [address], [address], 1200 [facility], [address] [place] [address]               FINAL SURGICAL PATHOLOGY REPORT     NAME:           Abilio Vital     Date of       03/11/2021                                            Collection:   Medical Record   [id number]              Date of       03/12/2021   Number:                                  Receipt:   Age:  68 Y        Sex:  F                Date          03/16/2021 16:21                                            Reported:   Date Of Birth:   1955   Financial        [id number]            Admitting     OBED GARCIA   Number:                                  Physician:   Patient          RITO MOONEY   Location:                                Physician:    JANA     Accession Number:  HES-                                           Additional Physicians:ASHKAN KO       Diagnosis:   A.  Skin, left neck:   Pigmented seborrheic keratosis with mild subepithelial chronic   inflammation, incompletely excised. Negative for malignancy. Laura Stevenson, left chest:   Polypoid skin with dermal capillary hemangioma.      Patrice Castellon, right upper back:   Abundant verrucous keratotic debris and focal benign superficial   papillomatous squamous epithelium with cautery-like artifact,   insufficient for further diagnosis, see

## 2021-05-10 ENCOUNTER — TELEPHONE (OUTPATIENT)
Dept: FAMILY MEDICINE CLINIC | Age: 66
End: 2021-05-10

## 2021-05-10 ENCOUNTER — TELEPHONE (OUTPATIENT)
Dept: ADMINISTRATIVE | Age: 66
End: 2021-05-10

## 2021-05-10 ENCOUNTER — NURSE TRIAGE (OUTPATIENT)
Dept: OTHER | Facility: CLINIC | Age: 66
End: 2021-05-10

## 2021-05-10 DIAGNOSIS — L23.7 POISON IVY: Primary | ICD-10-CM

## 2021-05-10 RX ORDER — CLOBETASOL PROPIONATE 0.5 MG/G
OINTMENT TOPICAL
Qty: 1 TUBE | Refills: 2 | Status: SHIPPED
Start: 2021-05-10 | End: 2022-01-25

## 2021-05-10 NOTE — TELEPHONE ENCOUNTER
Ignore the last message about glucometer and test strips.  I will send in a medication for her but she may need to be seen if it is too bad

## 2021-05-10 NOTE — TELEPHONE ENCOUNTER
Brief description of triage: Hives to full body arms, shoulders, stomach , thigh and tongue and  Left eyes swelling just started     Triage indicates for patient to go to ED now     Care advice provided, patient verbalizes understanding; denies any other questions or concerns; instructed to call back for any new or worsening symptoms. Attention Provider: Thank you for allowing me to participate in the care of your patient. The patient was connected to triage in response to symptoms provided. Please do not respond through this encounter as the response is not directed to a shared pool. Reason for Disposition   Swollen tongue    Answer Assessment - Initial Assessment Questions  1. APPEARANCE: \"What does the rash look like? \"       Red,  itchy and bumpy and raised     2. LOCATION: \"Where is the rash located? \"       Left eye, tongue swelling, hives to shoulder, neck , stomachs, and thighs   3. NUMBER: \"How many hives are there? \"       Too many to count     4. SIZE: \"How big are the hives? \" (inches, cm, compare to coins) \"Do they all look the same or is there lots of variation in shape and size? \"       Various sizes and very itchy     5. ONSET: \"When did the hives begin? \" (Hours or days ago)       Saturday night and getting worse since then     6. ITCHING: \"Does it itch? \" If so, ask: \"How bad is the itch? \"     - MILD: doesn't interfere with normal activities    - MODERATE-SEVERE: interferes with work, school, sleep, or other activities       Moderate to severe     7. RECURRENT PROBLEM: \"Have you had hives before? \" If so, ask: \"When was the last time? \" and \"What happened that time? \"       No     8. TRIGGERS: \"Were you exposed to any new food, plant, cosmetic product or animal just before the hives began? \"      spigit work that was doing over the weekend, the only new food was a spinach dish     9. OTHER SYMPTOMS: \"Do you have any other symptoms? \" (e.g., fever, tongue swelling, difficulty breathing, abdominal pain)      Tongue swelling, no fever or difficulty breathing, no abdominal pain     10. PREGNANCY: \"Is there any chance you are pregnant? \" \"When was your last menstrual period? \"        N/A    Protocols used: HIVES-ADULT-[place]

## 2021-05-17 ENCOUNTER — OFFICE VISIT (OUTPATIENT)
Dept: FAMILY MEDICINE CLINIC | Age: 66
End: 2021-05-17
Payer: COMMERCIAL

## 2021-05-17 VITALS
BODY MASS INDEX: 25.64 KG/M2 | SYSTOLIC BLOOD PRESSURE: 110 MMHG | OXYGEN SATURATION: 98 % | HEIGHT: 60 IN | TEMPERATURE: 97.3 F | DIASTOLIC BLOOD PRESSURE: 70 MMHG | HEART RATE: 76 BPM | RESPIRATION RATE: 18 BRPM | WEIGHT: 130.6 LBS

## 2021-05-17 DIAGNOSIS — L50.9 HIVES: Primary | ICD-10-CM

## 2021-05-17 DIAGNOSIS — E03.9 ACQUIRED HYPOTHYROIDISM: ICD-10-CM

## 2021-05-17 DIAGNOSIS — L98.9 DERMATOSIS: ICD-10-CM

## 2021-05-17 PROCEDURE — 99214 OFFICE O/P EST MOD 30 MIN: CPT | Performed by: PHYSICIAN ASSISTANT

## 2021-05-17 RX ORDER — PREDNISONE 20 MG/1
TABLET ORAL
Qty: 9 TABLET | Refills: 0 | Status: SHIPPED
Start: 2021-05-17 | End: 2022-01-25

## 2021-05-17 RX ORDER — TRIAMCINOLONE ACETONIDE 1 MG/G
CREAM TOPICAL
Qty: 45 G | Refills: 0 | Status: SHIPPED
Start: 2021-05-17 | End: 2021-10-13 | Stop reason: SDUPTHER

## 2021-05-17 NOTE — PROGRESS NOTES
Rash:  Patient is here today with complaints of a rash. This has been going on for a week. Rash is located on both inner thighs and the top of the left thigh. It is  spreading. Exacerbating factors include nothing. Alleviating factors include nothing. Associated signs and symptoms include nothing. Patient has not changed hygiene products. Patient does have pets. This has happened to patient in the past.  Patient has not had recent travel. Patient was seen at urgent care on 5/10/2021 and diagnosed with an allergic reaction that was unable to be pinpointed on anything specific. At that time, her left eye and tongue both swelled and patient had a rash. Per patient, she had a corticosteroid shot that worked for about an hour and then after that hour, patient had hives everywhere. covid vaccine in April  has hx of hypothyroid. tsh wnl in Feb.     Patient's past medical, surgical, social and/or family history reviewed, updated in chart, and are non-contributory (unless otherwise stated). Medications and allergies also reviewed and updated in chart.       Review of Systems:  Constitutional:  No fever, no fatigue, no chills, no headaches, no weight change  Dermatology:  + rash, no mole, no dry or sensitive skin  ENT:  No cough, no sore throat, no sinus pain, no runny nose, no ear pain  Cardiology:  No chest pain, no palpitations, no leg edema, no shortness of breath, no PND  Gastroenterology:  No dysphagia, no abdominal pain, no nausea, no vomiting, no constipation, no diarrhea, no heartburn  Musculoskeletal:  No joint pain, no leg cramps, no back pain, no muscle aches  Respiratory:  No shortness of breath, no orthopnea, no wheezing, no MURILLO, no hemoptysis  Urology:  No blood in the urine, no urinary frequency, no urinary incontinence, no urinary urgency, no nocturia, no dysuria    Vitals:    05/17/21 1145   BP: 110/70   Pulse: 76   Resp: 18   Temp: 97.3 °F (36.3 °C)   TempSrc: Temporal   SpO2: 98% Weight: 130 lb 9.6 oz (59.2 kg)   Height: 5' (1.524 m)       Physical Exam  Constitutional:       General: She is not in acute distress. Appearance: She is well-developed. HENT:      Head: Normocephalic and atraumatic. Right Ear: External ear normal.      Left Ear: External ear normal.      Nose: Nose normal.   Eyes:      General: No scleral icterus. Conjunctiva/sclera: Conjunctivae normal.      Pupils: Pupils are equal, round, and reactive to light. Neck:      Thyroid: No thyromegaly. Cardiovascular:      Rate and Rhythm: Normal rate and regular rhythm. Heart sounds: Normal heart sounds. No murmur heard. Pulmonary:      Effort: Pulmonary effort is normal. No accessory muscle usage or respiratory distress. Breath sounds: Normal breath sounds. No wheezing. Musculoskeletal:         General: Normal range of motion. Cervical back: Normal range of motion and neck supple. Skin:     General: Skin is warm and dry. Findings: Erythema (legs, upper) present. No rash. Neurological:      Mental Status: She is alert and oriented to person, place, and time. Deep Tendon Reflexes: Reflexes are normal and symmetric. Psychiatric:         Speech: Speech normal.         Behavior: Behavior normal.         Assessment/Plan:      Gonzalez Ham was seen today for rash. Diagnoses and all orders for this visit:    [address] Allergen Profile; Future  -     Respiratory allergen profile; Future  -     predniSONE (DELTASONE) 20 MG tablet; 1 po bid x 3 days, then 1 po qd x 3 days    Dermatosis  -     CBC Auto Differential; Future  -     predniSONE (DELTASONE) 20 MG tablet; 1 po bid x 3 days, then 1 po qd x 3 days  -     triamcinolone (KENALOG) 0.1 % cream; Apply topically 2 times daily. Acquired hypothyroidism  -     TSH; Future  -     T4, FREE; Future      As above.   Call or go to ED immediately if symptoms worsen or persist.  Return in about 3 months (around 8/17/2021), or if symptoms worsen or fail to improve. , or sooner if necessary. Educational materials and/or home exercises printed for patient's review and were included in patient instructions on his/her After Visit Summary and given to patient at the end of visit. Counseled regarding above diagnosis, including possible risks and complications,  especially if left uncontrolled. Counseled regarding the possible side effects, risks, benefits and alternatives to treatment; patient and/or guardian verbalizes understanding, agrees, feels comfortable with and wishes to proceed with above treatment plan. Advised patient to call with any new medication issues, and read all Rx info from pharmacy to assure aware of all possible risks and side effects of medication before taking. Reviewed age and gender appropriate health screening exams and vaccinations. Advised patient regarding importance of keeping up with recommended health maintenance and to schedule as soon as possible if overdue, as this is important in assessing for undiagnosed pathology, especially cancer, as well as protecting against potentially harmful/life threatening disease. Patient and/or guardian verbalizes understanding and agrees with above counseling, assessment and plan. All questions answered. John Wang PA-C  5/17/2021    I have personally reviewed and updated the chief complaint, HPI, Past Medical, Family and Social History, as well as the above Review of Systems.

## 2021-05-18 ENCOUNTER — HOSPITAL ENCOUNTER (OUTPATIENT)
Age: 66
Discharge: HOME | End: 2021-05-18
Payer: COMMERCIAL

## 2021-05-18 DIAGNOSIS — E03.9 ACQUIRED HYPOTHYROIDISM: ICD-10-CM

## 2021-05-18 DIAGNOSIS — L98.9 DERMATOSIS: ICD-10-CM

## 2021-05-18 DIAGNOSIS — L50.9 HIVES: ICD-10-CM

## 2021-05-18 LAB
BASOPHILS ABSOLUTE: 0.03 E9/L (ref 0–0.2)
BASOPHILS RELATIVE PERCENT: 0.5 % (ref 0–2)
EOSINOPHILS ABSOLUTE: 0.41 E9/L (ref 0.05–0.5)
EOSINOPHILS RELATIVE PERCENT: 6.9 % (ref 0–6)
HCT VFR BLD CALC: 40.3 % (ref 34–48)
HEMOGLOBIN: 13.3 G/DL (ref 11.5–15.5)
IMMATURE GRANULOCYTES #: 0.04 E9/L
IMMATURE GRANULOCYTES %: 0.7 % (ref 0–5)
LYMPHOCYTES ABSOLUTE: 2.26 E9/L (ref 1.5–4)
LYMPHOCYTES RELATIVE PERCENT: 38.2 % (ref 20–42)
MCH RBC QN AUTO: 30 PG (ref 26–35)
MCHC RBC AUTO-ENTMCNC: 33 % (ref 32–34.5)
MCV RBC AUTO: 91 FL (ref 80–99.9)
MONOCYTES ABSOLUTE: 0.42 E9/L (ref 0.1–0.95)
MONOCYTES RELATIVE PERCENT: 7.1 % (ref 2–12)
NEUTROPHILS ABSOLUTE: 2.75 E9/L (ref 1.8–7.3)
NEUTROPHILS RELATIVE PERCENT: 46.6 % (ref 43–80)
PDW BLD-RTO: 13.2 FL (ref 11.5–15)
PLATELET # BLD: 318 E9/L (ref 130–450)
PMV BLD AUTO: 10.3 FL (ref 7–12)
RBC # BLD: 4.43 E12/L (ref 3.5–5.5)
T4 FREE: 1.35 NG/DL (ref 0.93–1.7)
TSH SERPL DL<=0.05 MIU/L-ACNC: 1.44 UIU/ML (ref 0.27–4.2)
WBC # BLD: 5.9 E9/L (ref 4.5–11.5)

## 2021-05-18 PROCEDURE — 82785 ASSAY OF IGE: CPT

## 2021-05-18 PROCEDURE — 84439 ASSAY OF FREE THYROXINE: CPT

## 2021-05-18 PROCEDURE — 86003 ALLG SPEC IGE CRUDE XTRC EA: CPT

## 2021-05-18 PROCEDURE — 36415 COLL VENOUS BLD VENIPUNCTURE: CPT

## 2021-05-18 PROCEDURE — 84443 ASSAY THYROID STIM HORMONE: CPT

## 2021-05-18 PROCEDURE — 85025 COMPLETE CBC W/AUTO DIFF WBC: CPT

## 2021-05-19 ENCOUNTER — TELEPHONE (OUTPATIENT)
Dept: FAMILY MEDICINE CLINIC | Age: 66
End: 2021-05-19

## 2021-05-19 NOTE — TELEPHONE ENCOUNTER
Patient called in and stated she was here on Monday to see Maryana Vega and she forgot to tell provider that she's been short of breath. I called patient back to get more information and left a voicemail.

## 2021-05-20 NOTE — TELEPHONE ENCOUNTER
Difficult to say as I did not see her but according to notes was normal. Recommend an appointment next week if not improved or urgent care over the weekend.

## 2021-05-20 NOTE — TELEPHONE ENCOUNTER
Pt LM, She said the predniSONE (DELTASONE) 20 MG tablet       Is working but the inhaler is not really doing anything. I tried to call Pt back about the suggestions, LM asking Pt to call tomorrow to schedule Appt.

## 2021-05-21 ENCOUNTER — TELEPHONE (OUTPATIENT)
Dept: FAMILY MEDICINE CLINIC | Age: 66
End: 2021-05-21

## 2021-05-21 LAB
2000687N OAK TREE IGE: <0.1 KU/L (ref 0–0.34)
ALLERGEN BARLEY IGE: 0.55 KU/L
ALLERGEN BEEF: <0.1 KU/L
ALLERGEN BERMUDA GRASS IGE: <0.1 KU/L (ref 0–0.34)
ALLERGEN BIRCH IGE: <0.1 KU/L (ref 0–0.34)
ALLERGEN CABBAGE IGE: <0.1 KU/L
ALLERGEN CARROT IGE: <0.1 KU/L
ALLERGEN CHICKEN IGE: <0.1 KU/L
ALLERGEN CODFISH IGE: <0.1 KU/L
ALLERGEN CORN IGE: <0.1 KU/L
ALLERGEN COW MILK IGE: <0.1 KU/L
ALLERGEN CRAB IGE: <0.1 KU/L
ALLERGEN DOG DANDER IGE: <0.1 KU/L (ref 0–0.34)
ALLERGEN EGG WHITE IGE: 0.12 KU/L
ALLERGEN GERMAN COCKROACH IGE: <0.1 KU/L (ref 0–0.34)
ALLERGEN GRAPE IGE: <0.1 KU/L
ALLERGEN HORMODENDRUM IGE: <0.1 KUL/L (ref 0–0.34)
ALLERGEN LETTUCE IGE: <0.1 KU/L
ALLERGEN MOUSE EPITHELIA IGE: <0.1 KU/L (ref 0–0.34)
ALLERGEN NAVY BEAN: <0.1 KU/L
ALLERGEN OAT: <0.1 KU/L
ALLERGEN ORANGE IGE: <0.1 KU/L
ALLERGEN PEANUT (F13) IGE: <0.1 KU/L
ALLERGEN PECAN TREE IGE: <0.1 KU/L (ref 0–0.34)
ALLERGEN PEPPER C. ANNUUM IGE: <0.1 KU/L
ALLERGEN PIGWEED ROUGH IGE: <0.1 KU/L (ref 0–0.34)
ALLERGEN PORK: <0.1 KU/L
ALLERGEN RICE IGE: <0.1 KU/L
ALLERGEN RYE IGE: 0.49 KU/L
ALLERGEN SHEEP SORREL (W18) IGE: <0.1 KU/L (ref 0–0.34)
ALLERGEN SOYBEAN IGE: <0.1 KU/L
ALLERGEN TOMATO IGE: <0.1 KU/L
ALLERGEN TREE SYCAMORE: <0.1 KU/L (ref 0–0.34)
ALLERGEN TUNA IGE: <0.1 KU/L
ALLERGEN WALNUT TREE IGE: <0.1 KU/L (ref 0–0.34)
ALLERGEN WHEAT IGE: 0.63 KU/L
ALLERGEN WHITE MULBERRY TREE, IGE: <0.1 KU/L (ref 0–0.34)
ALLERGEN, TREE, WHITE ASH IGE: <0.1 KU/L (ref 0–0.34)
ALTERNARIA ALTERNATA: <0.1 KU/L (ref 0–0.34)
ASPERGILLUS FUMIGATUS: <0.1 KU/L (ref 0–0.34)
CAT DANDER ANTIBODY: 0.74 KU/L (ref 0–0.34)
COTTONWOOD TREE: <0.1 KU/L (ref 0–0.34)
D. FARINAE: <0.1 KU/L (ref 0–0.34)
D. PTERONYSSINUS: <0.1 KU/L (ref 0–0.34)
ELM TREE: <0.1 KU/L (ref 0–0.34)
IGE: 144 IU/ML
IGE: 145 IU/ML
MAPLE/BOXELDER TREE: <0.1 KU/L (ref 0–0.34)
MOUNTAIN CEDAR TREE: <0.1 KU/L (ref 0–0.34)
MUCOR RACEMOSUS: <0.1 KU/L (ref 0–0.34)
P. NOTATUM: <0.1 KU/L (ref 0–0.34)
POTATO, IGE: <0.1 KU/L
RUSSIAN THISTLE: <0.1 KU/L (ref 0–0.34)
SHORT RAGWD(A ARTEMIS.) IGE: <0.1 KU/L (ref 0–0.34)
SHRIMP: <0.1 KU/L
TIMOTHY GRASS: <0.1 KU/L (ref 0–0.34)

## 2021-05-24 NOTE — TELEPHONE ENCOUNTER
Her allergy panel is positive for cat dander allergy. Food allergy panel was negative. Thyroid labs were wnl.

## 2021-05-26 ENCOUNTER — TELEPHONE (OUTPATIENT)
Dept: FAMILY MEDICINE CLINIC | Age: 66
End: 2021-05-26

## 2021-06-07 DIAGNOSIS — R05.8 POST-VIRAL COUGH SYNDROME: ICD-10-CM

## 2021-06-07 RX ORDER — ALBUTEROL SULFATE 90 UG/1
1 AEROSOL, METERED RESPIRATORY (INHALATION) EVERY 4 HOURS PRN
Qty: 1 INHALER | Refills: 3 | Status: SHIPPED | OUTPATIENT
Start: 2021-06-07

## 2021-10-13 DIAGNOSIS — L98.9 DERMATOSIS: ICD-10-CM

## 2021-10-13 RX ORDER — TRIAMCINOLONE ACETONIDE 1 MG/G
CREAM TOPICAL
Qty: 45 G | Refills: 0 | Status: SHIPPED
Start: 2021-10-13 | End: 2022-01-25

## 2021-10-15 DIAGNOSIS — E03.9 ACQUIRED HYPOTHYROIDISM: ICD-10-CM

## 2021-10-18 RX ORDER — LEVOTHYROXINE SODIUM 75 MCG
75 TABLET ORAL DAILY
Qty: 30 TABLET | Refills: 5 | Status: SHIPPED
Start: 2021-10-18 | End: 2022-04-15

## 2021-10-22 LAB — PAP SMEAR, EXTERNAL: NORMAL

## 2021-11-11 ENCOUNTER — HOSPITAL ENCOUNTER (OUTPATIENT)
Dept: GENERAL RADIOLOGY | Age: 66
Discharge: HOME | End: 2021-11-13
Payer: COMMERCIAL

## 2021-11-11 DIAGNOSIS — Z13.820 SCREENING FOR OSTEOPOROSIS: ICD-10-CM

## 2021-11-11 DIAGNOSIS — Z12.31 VISIT FOR SCREENING MAMMOGRAM: ICD-10-CM

## 2021-11-11 PROCEDURE — 77063 BREAST TOMOSYNTHESIS BI: CPT

## 2021-11-11 PROCEDURE — 77080 DXA BONE DENSITY AXIAL: CPT

## 2021-12-30 ENCOUNTER — HOSPITAL ENCOUNTER (OUTPATIENT)
Dept: MRI IMAGING | Age: 66
Discharge: HOME | End: 2022-01-01
Payer: COMMERCIAL

## 2021-12-30 DIAGNOSIS — Z91.89 AT HIGH RISK FOR BREAST CANCER: ICD-10-CM

## 2021-12-30 PROCEDURE — 77049 MRI BREAST C-+ W/CAD BI: CPT

## 2021-12-30 PROCEDURE — A9585 GADOBUTROL INJECTION: HCPCS | Performed by: RADIOLOGY

## 2021-12-30 PROCEDURE — 6360000004 HC RX CONTRAST MEDICATION: Performed by: RADIOLOGY

## 2021-12-30 RX ADMIN — GADOBUTROL 6 ML: 604.72 INJECTION INTRAVENOUS at 07:54

## 2022-01-24 NOTE — PROGRESS NOTES
[address] [facility] (:  1955) is a 73 y.o. female,New patient, here for evaluation of the following chief complaint(s):  Established New Doctor (pt also is having some sinus issues and a chronic cough )         ASSESSMENT/PLAN:  1. Acquired hypothyroidism  -     TSH without Reflex; Future  Pt stable on current meds. Will get labs prior to next visit. 2. Chronic cough  -     Full PFT Study With Bronchodilator; Future  Pt not responsive to albuterol. Will get PFTs to get diagnosis. 3. Elevated glucose  -     Hemoglobin A1C; Future  4. Moderate mixed hyperlipidemia not requiring statin therapy  -     Lipid, Fasting; Future  5. Low calcium levels  -     Comprehensive Metabolic Panel; Future  6. Atypical ductal hyperplasia of right breast  -     CBC WITH AUTO DIFFERENTIAL; Future  Pt had biopsy only. 7. Eosinophilia, unspecified type  -     CBC WITH AUTO DIFFERENTIAL; Future  -     Full PFT Study With Bronchodilator; Future  Pt had possible allergic reaction but nothing specific was found. 8. Encounter for preventive care  -     Fecal DNA Colorectal cancer screening (Cologuard)   Pap and mammo ordered from Dr. Bay Thompson office    Return in about 3 months (around 2022) for cough. Subjective   SUBJECTIVE/OBJECTIVE:  HPI     Has a chronic cough and was told it was asthma. She uses the albuterol when symptoms get worse in the winter but it does help much. Cough is worse when it is cold or when she laughs a lot. No drainage, no heartburn symtoms. Need methacholine challenge. She also has sinus pain in the right cheek. It comes and goes. IBU helps. It lasts for 30-40 min. Has been coming and going for the last 3-4 weeks. No other symptoms - no runny nose, etc.     Had thyroid removed at age 5-6  She had a goiter. It was overactive. There is just a bit there. Needs colon ca screening - last colonoscopy was WNL. No colon cancer s/s. Do cologuard.  Had a hard time with the colonoscopy last time. Needs pap. Dr. Badillo Early. She just had pap. Has hx of ductal hyperplasia of breast - had normal mammo a few months ago. Hypothyroidism - last TSH in May, 2021 WNL. Labs prior to next visit - Lipid, TSH, HbA1c (elevated glucose), CMP (low calcium), CBC with dif (elevated eosinophils). . Pt given lab slips. She had an allergic reaction to something. She had an allergy workup eosinophils are high due to cats. She take claritin over the counter when she remembers. Review of Systems   Constitutional: Negative for activity change, appetite change, chills, fatigue, fever and unexpected weight change. HENT: Positive for sinus pain. Negative for congestion, postnasal drip and rhinorrhea. Eyes: Negative for discharge and itching. Respiratory: Positive for cough. Negative for chest tightness, shortness of breath and wheezing. Cardiovascular: Negative for chest pain, palpitations and leg swelling. Gastrointestinal: Negative for abdominal pain, blood in stool, constipation, diarrhea and vomiting. Endocrine: Negative for cold intolerance and heat intolerance. Genitourinary: Negative for dysuria, frequency and vaginal bleeding. Musculoskeletal: Negative for arthralgias, back pain, gait problem, joint swelling and myalgias. Skin: Negative for rash. Neurological: Negative for dizziness, syncope and light-headedness. Psychiatric/Behavioral: Negative for dysphoric mood. The patient is not nervous/anxious. Objective   Physical Exam  Vitals and nursing note reviewed. Constitutional:       Appearance: Normal appearance. She is well-developed. She is not ill-appearing or toxic-appearing. HENT:      Head: Normocephalic and atraumatic. Eyes:      General: No scleral icterus. Right eye: No discharge. Left eye: No discharge. Neck:      Thyroid: No thyromegaly. Vascular: No carotid bruit.    Cardiovascular:      Rate and Rhythm: Normal rate and regular rhythm. Heart sounds: Normal heart sounds, S1 normal and S2 normal. No murmur heard. No friction rub. No gallop. Pulmonary:      Effort: Pulmonary effort is normal. No respiratory distress. Breath sounds: Normal breath sounds and air entry. No decreased breath sounds, wheezing or rales. Abdominal:      Palpations: Abdomen is soft. Tenderness: There is no abdominal tenderness. Musculoskeletal:      Cervical back: Neck supple. Right lower leg: No edema. Left lower leg: No edema. Lymphadenopathy:      Cervical: No cervical adenopathy. Skin:     General: Skin is warm and dry. Neurological:      General: No focal deficit present. Mental Status: She is alert and oriented to person, place, and time. Gait: Gait normal.   Psychiatric:         Attention and Perception: Attention normal.         Mood and Affect: Mood and affect normal.         Speech: Speech normal.         Behavior: Behavior normal. Behavior is cooperative. Thought Content: Thought content normal.         Cognition and Memory: Cognition and memory normal.         Judgment: Judgment normal.            An electronic signature was used to authenticate this note.     --Fredo Ashraf MD

## 2022-01-25 ENCOUNTER — OFFICE VISIT (OUTPATIENT)
Dept: FAMILY MEDICINE CLINIC | Age: 67
End: 2022-01-25
Payer: MEDICARE

## 2022-01-25 VITALS
DIASTOLIC BLOOD PRESSURE: 82 MMHG | BODY MASS INDEX: 25.6 KG/M2 | TEMPERATURE: 97.7 F | HEART RATE: 63 BPM | WEIGHT: 130.4 LBS | SYSTOLIC BLOOD PRESSURE: 135 MMHG | OXYGEN SATURATION: 99 % | HEIGHT: 60 IN

## 2022-01-25 DIAGNOSIS — D72.10 EOSINOPHILIA, UNSPECIFIED TYPE: ICD-10-CM

## 2022-01-25 DIAGNOSIS — N60.91 ATYPICAL DUCTAL HYPERPLASIA OF RIGHT BREAST: ICD-10-CM

## 2022-01-25 DIAGNOSIS — R05.3 CHRONIC COUGH: ICD-10-CM

## 2022-01-25 DIAGNOSIS — Z00.00 ENCOUNTER FOR PREVENTIVE CARE: ICD-10-CM

## 2022-01-25 DIAGNOSIS — E03.9 ACQUIRED HYPOTHYROIDISM: Primary | ICD-10-CM

## 2022-01-25 DIAGNOSIS — E78.2 MODERATE MIXED HYPERLIPIDEMIA NOT REQUIRING STATIN THERAPY: ICD-10-CM

## 2022-01-25 DIAGNOSIS — E83.51 LOW CALCIUM LEVELS: ICD-10-CM

## 2022-01-25 DIAGNOSIS — R73.09 ELEVATED GLUCOSE: ICD-10-CM

## 2022-01-25 PROCEDURE — G8417 CALC BMI ABV UP PARAM F/U: HCPCS | Performed by: FAMILY MEDICINE

## 2022-01-25 PROCEDURE — 4040F PNEUMOC VAC/ADMIN/RCVD: CPT | Performed by: FAMILY MEDICINE

## 2022-01-25 PROCEDURE — 1090F PRES/ABSN URINE INCON ASSESS: CPT | Performed by: FAMILY MEDICINE

## 2022-01-25 PROCEDURE — G8399 PT W/DXA RESULTS DOCUMENT: HCPCS | Performed by: FAMILY MEDICINE

## 2022-01-25 PROCEDURE — G8484 FLU IMMUNIZE NO ADMIN: HCPCS | Performed by: FAMILY MEDICINE

## 2022-01-25 PROCEDURE — 3017F COLORECTAL CA SCREEN DOC REV: CPT | Performed by: FAMILY MEDICINE

## 2022-01-25 PROCEDURE — 99214 OFFICE O/P EST MOD 30 MIN: CPT | Performed by: FAMILY MEDICINE

## 2022-01-25 PROCEDURE — 1036F TOBACCO NON-USER: CPT | Performed by: FAMILY MEDICINE

## 2022-01-25 PROCEDURE — 1123F ACP DISCUSS/DSCN MKR DOCD: CPT | Performed by: FAMILY MEDICINE

## 2022-01-25 PROCEDURE — G8427 DOCREV CUR MEDS BY ELIG CLIN: HCPCS | Performed by: FAMILY MEDICINE

## 2022-01-28 ASSESSMENT — ENCOUNTER SYMPTOMS
DIARRHEA: 0
VOMITING: 0
EYE ITCHING: 0
SHORTNESS OF BREATH: 0
EYE DISCHARGE: 0
CHEST TIGHTNESS: 0
BACK PAIN: 0
RHINORRHEA: 0
CONSTIPATION: 0
WHEEZING: 0
COUGH: 1
SINUS PAIN: 1
BLOOD IN STOOL: 0
ABDOMINAL PAIN: 0

## 2022-02-07 ENCOUNTER — TELEPHONE (OUTPATIENT)
Dept: FAMILY MEDICINE CLINIC | Age: 67
End: 2022-02-07

## 2022-02-07 NOTE — TELEPHONE ENCOUNTER
PROVIDER FEEDBACK LOOP CALLED 3X     Patient:Brittany Erwin  : 1955  Referring Provider: Milagros Yepez  Referral Type: Other     Procedures:  PFT30 - FULL PFT STUDY WITH BRONCHODILATOR  Date Service Ordered 2022        We were unable to reach [address] [place] [address] to schedule test ordered by your office after 3 call attempts. Please call your patient to explain significance of ordered test and direct patient to call Central Scheduling to re-schedule test at their earliest convenience.     Please complete one of the following actions from Quick Actions buttons:     Route to Provider:  Route message to ordering provider to seek next steps in care plan.     Telephone Encounter:  Telephone encounter will open. Call patient to explain significance of ordered test and direct patient to call Central Scheduling to schedule test then Document details of call.     Open Referral:  Review referral notes or details if needed.     Close Referral:  Referral will open. Document in Notes section of referral why the referral is being closed. Examples of referral closure:  Patient had test done outside of [facility] ([facility]) (list location), Patient refuses test, Patient no longer having symptoms, Unable to reach patient. Only close the referral if you are sure the test will not proceed.     Thank you     Central Scheduling           Called and left detailed message for pt.   Advised her to call to schedule and appointment for her PFT-30

## 2022-02-07 NOTE — TELEPHONE ENCOUNTER
Please call patient and see if she still wants the PFT's. If not, I will cancel. If so, we need to give her the number so she can call and schedule. Thanks.

## 2022-02-10 ENCOUNTER — HOSPITAL ENCOUNTER (OUTPATIENT)
Dept: PULMONOLOGY | Age: 67
Discharge: HOME | End: 2022-02-10
Payer: MEDICARE

## 2022-02-10 ENCOUNTER — HOSPITAL ENCOUNTER (OUTPATIENT)
Age: 67
Discharge: HOME | End: 2022-02-10
Payer: MEDICARE

## 2022-02-10 DIAGNOSIS — E83.51 LOW CALCIUM LEVELS: ICD-10-CM

## 2022-02-10 DIAGNOSIS — E03.9 ACQUIRED HYPOTHYROIDISM: ICD-10-CM

## 2022-02-10 DIAGNOSIS — N60.91 ATYPICAL DUCTAL HYPERPLASIA OF RIGHT BREAST: ICD-10-CM

## 2022-02-10 DIAGNOSIS — D72.10 EOSINOPHILIA, UNSPECIFIED TYPE: ICD-10-CM

## 2022-02-10 DIAGNOSIS — E78.2 MODERATE MIXED HYPERLIPIDEMIA NOT REQUIRING STATIN THERAPY: ICD-10-CM

## 2022-02-10 DIAGNOSIS — R05.3 CHRONIC COUGH: ICD-10-CM

## 2022-02-10 DIAGNOSIS — R73.09 ELEVATED GLUCOSE: ICD-10-CM

## 2022-02-10 LAB
ALBUMIN SERPL-MCNC: 4.1 G/DL (ref 3.5–5.2)
ALP BLD-CCNC: 100 U/L (ref 35–104)
ALT SERPL-CCNC: 16 U/L (ref 0–32)
ANION GAP SERPL CALCULATED.3IONS-SCNC: 16 MMOL/L (ref 7–16)
AST SERPL-CCNC: 21 U/L (ref 0–31)
BASOPHILS ABSOLUTE: 0.04 E9/L (ref 0–0.2)
BASOPHILS RELATIVE PERCENT: 0.8 % (ref 0–2)
BILIRUB SERPL-MCNC: 0.5 MG/DL (ref 0–1.2)
BUN BLDV-MCNC: 12 MG/DL (ref 6–23)
CALCIUM SERPL-MCNC: 8.3 MG/DL (ref 8.6–10.2)
CHLORIDE BLD-SCNC: 102 MMOL/L (ref 98–107)
CHOLESTEROL, FASTING: 211 MG/DL (ref 0–199)
CO2: 24 MMOL/L (ref 22–29)
CREAT SERPL-MCNC: 0.9 MG/DL (ref 0.5–1)
EOSINOPHILS ABSOLUTE: 0.35 E9/L (ref 0.05–0.5)
EOSINOPHILS RELATIVE PERCENT: 7.3 % (ref 0–6)
GFR AFRICAN AMERICAN: >60
GFR NON-AFRICAN AMERICAN: >60 ML/MIN/1.73
GLUCOSE BLD-MCNC: 117 MG/DL (ref 74–99)
HBA1C MFR BLD: 5.3 % (ref 4–5.6)
HCT VFR BLD CALC: 41 % (ref 34–48)
HDLC SERPL-MCNC: 69 MG/DL
HEMOGLOBIN: 13.2 G/DL (ref 11.5–15.5)
IMMATURE GRANULOCYTES #: 0.01 E9/L
IMMATURE GRANULOCYTES %: 0.2 % (ref 0–5)
LDL CHOLESTEROL CALCULATED: 115 MG/DL (ref 0–99)
LYMPHOCYTES ABSOLUTE: 1.84 E9/L (ref 1.5–4)
LYMPHOCYTES RELATIVE PERCENT: 38.5 % (ref 20–42)
MCH RBC QN AUTO: 28.9 PG (ref 26–35)
MCHC RBC AUTO-ENTMCNC: 32.2 % (ref 32–34.5)
MCV RBC AUTO: 89.7 FL (ref 80–99.9)
MONOCYTES ABSOLUTE: 0.39 E9/L (ref 0.1–0.95)
MONOCYTES RELATIVE PERCENT: 8.2 % (ref 2–12)
NEUTROPHILS ABSOLUTE: 2.15 E9/L (ref 1.8–7.3)
NEUTROPHILS RELATIVE PERCENT: 45 % (ref 43–80)
PDW BLD-RTO: 13.3 FL (ref 11.5–15)
PLATELET # BLD: 286 E9/L (ref 130–450)
PMV BLD AUTO: 11.1 FL (ref 7–12)
POTASSIUM SERPL-SCNC: 4.1 MMOL/L (ref 3.5–5)
RBC # BLD: 4.57 E12/L (ref 3.5–5.5)
SODIUM BLD-SCNC: 142 MMOL/L (ref 132–146)
TOTAL PROTEIN: 6.8 G/DL (ref 6.4–8.3)
TRIGLYCERIDE, FASTING: 133 MG/DL (ref 0–149)
TSH SERPL DL<=0.05 MIU/L-ACNC: 0.41 UIU/ML (ref 0.27–4.2)
VLDLC SERPL CALC-MCNC: 27 MG/DL
WBC # BLD: 4.8 E9/L (ref 4.5–11.5)

## 2022-02-10 PROCEDURE — 84443 ASSAY THYROID STIM HORMONE: CPT

## 2022-02-10 PROCEDURE — 83036 HEMOGLOBIN GLYCOSYLATED A1C: CPT

## 2022-02-10 PROCEDURE — 85025 COMPLETE CBC W/AUTO DIFF WBC: CPT

## 2022-02-10 PROCEDURE — 94729 DIFFUSING CAPACITY: CPT

## 2022-02-10 PROCEDURE — 94060 EVALUATION OF WHEEZING: CPT

## 2022-02-10 PROCEDURE — 80061 LIPID PANEL: CPT

## 2022-02-10 PROCEDURE — 36415 COLL VENOUS BLD VENIPUNCTURE: CPT

## 2022-02-10 PROCEDURE — 94726 PLETHYSMOGRAPHY LUNG VOLUMES: CPT

## 2022-02-10 PROCEDURE — 80053 COMPREHEN METABOLIC PANEL: CPT

## 2022-02-18 NOTE — PROCEDURES
[address]                  [address] [facility],  [facility]                               PULMONARY FUNCTION    PATIENT NAME: Robin Pro                   :        1955  MED REC NO:   [id number]                            ROOM:  ACCOUNT NO:   [de-identified]                           ADMIT DATE: 02/10/2022  PROVIDER:     Esvin Ghotra MD    DATE OF PROCEDURE:  02/10/2022    SPIROMETRY:  FVC 2.02 which is 78 of predicted. FEV1 of 1.76 which is  86 of predicted. FEV1/FVC 87 which is 110 of predicted. LUNG VOLUMES: Slow vital capacity 1.93 which is 74 of predicted. ERV  0.18 which is 28 of predicted. TLC 4.34 which is 97 of predicted. RV/TLC 56 which is 132 of predicted. DLCO 9.26 which is 49 of  predicted, corrects for alveolar volume 104. IMPRESSION:  This PFT is showing evidence of nonspecific spirometry with  severe air trapping with moderate reduction in diffusion capacity that  corrects for alveolar volume. Clinical and radiological correlation  indicated to rule out interstitial lung disease that can cause the  patient chronic cough. It was previously noted that her spirometry  declined after bronchodilator and she changed to obstructive pattern and  severe changes after post bronchial which is strange. So, I would  recommend highly pulmonary evaluation for this patient along with the  clinical and radiological finding.         Jeyson Nair MD    D: 2022 12:09:31       T: 2022 12:12:12     [email or web address]  Job#: [id number]     Doc#: [id number]

## 2022-02-21 PROCEDURE — 94726 PLETHYSMOGRAPHY LUNG VOLUMES: CPT | Performed by: INTERNAL MEDICINE

## 2022-02-21 PROCEDURE — 94729 DIFFUSING CAPACITY: CPT | Performed by: INTERNAL MEDICINE

## 2022-02-21 PROCEDURE — 94060 EVALUATION OF WHEEZING: CPT | Performed by: INTERNAL MEDICINE

## 2022-02-22 NOTE — RESULT ENCOUNTER NOTE
Please call patient. Her pulmonary function tests were read as unclear and needing more testing. They are recommending that she see a pulmonologist.  Does she have a preference? (I tried to call but she did not ). Thanks.

## 2022-02-25 LAB — NONINV COLON CA DNA+OCC BLD SCRN STL QL: NEGATIVE

## 2022-02-25 NOTE — RESULT ENCOUNTER NOTE
Please let patient know that her colon cancer screening is normal.  We will repeat in 3 years. Thanks.

## 2022-04-15 DIAGNOSIS — E03.9 ACQUIRED HYPOTHYROIDISM: ICD-10-CM

## 2022-04-15 RX ORDER — LEVOTHYROXINE SODIUM 75 MCG
75 TABLET ORAL DAILY
Qty: 30 TABLET | Refills: 0 | Status: SHIPPED
Start: 2022-04-15 | End: 2022-05-03 | Stop reason: SDUPTHER

## 2022-04-15 NOTE — TELEPHONE ENCOUNTER
Pt needs appointment for blood work and a visit.   Can see resident if she can't get in with me.  1 mn of meds sent in.  Amisha Arcos

## 2022-05-03 ENCOUNTER — OFFICE VISIT (OUTPATIENT)
Dept: FAMILY MEDICINE CLINIC | Age: 67
End: 2022-05-03
Payer: MEDICARE

## 2022-05-03 VITALS
OXYGEN SATURATION: 97 % | HEIGHT: 60 IN | RESPIRATION RATE: 20 BRPM | BODY MASS INDEX: 26.31 KG/M2 | SYSTOLIC BLOOD PRESSURE: 122 MMHG | TEMPERATURE: 97.8 F | WEIGHT: 134 LBS | HEART RATE: 77 BPM | DIASTOLIC BLOOD PRESSURE: 77 MMHG

## 2022-05-03 DIAGNOSIS — Z00.00 WELCOME TO MEDICARE PREVENTIVE VISIT: Primary | ICD-10-CM

## 2022-05-03 DIAGNOSIS — R05.3 CHRONIC COUGH: ICD-10-CM

## 2022-05-03 DIAGNOSIS — E03.9 ACQUIRED HYPOTHYROIDISM: ICD-10-CM

## 2022-05-03 PROCEDURE — 3017F COLORECTAL CA SCREEN DOC REV: CPT | Performed by: FAMILY MEDICINE

## 2022-05-03 PROCEDURE — G0402 INITIAL PREVENTIVE EXAM: HCPCS | Performed by: FAMILY MEDICINE

## 2022-05-03 PROCEDURE — 1123F ACP DISCUSS/DSCN MKR DOCD: CPT | Performed by: FAMILY MEDICINE

## 2022-05-03 PROCEDURE — 4040F PNEUMOC VAC/ADMIN/RCVD: CPT | Performed by: FAMILY MEDICINE

## 2022-05-03 RX ORDER — LEVOTHYROXINE SODIUM 75 MCG
75 TABLET ORAL DAILY
Qty: 90 TABLET | Refills: 1 | Status: SHIPPED | OUTPATIENT
Start: 2022-05-03

## 2022-05-03 SDOH — HEALTH STABILITY: PHYSICAL HEALTH: ON AVERAGE, HOW MANY MINUTES DO YOU ENGAGE IN EXERCISE AT THIS LEVEL?: 20 MIN

## 2022-05-03 SDOH — ECONOMIC STABILITY: FOOD INSECURITY: WITHIN THE PAST 12 MONTHS, THE FOOD YOU BOUGHT JUST DIDN'T LAST AND YOU DIDN'T HAVE MONEY TO GET MORE.: NEVER TRUE

## 2022-05-03 SDOH — HEALTH STABILITY: PHYSICAL HEALTH: ON AVERAGE, HOW MANY DAYS PER WEEK DO YOU ENGAGE IN MODERATE TO STRENUOUS EXERCISE (LIKE A BRISK WALK)?: 2 DAYS

## 2022-05-03 SDOH — ECONOMIC STABILITY: FOOD INSECURITY: WITHIN THE PAST 12 MONTHS, YOU WORRIED THAT YOUR FOOD WOULD RUN OUT BEFORE YOU GOT MONEY TO BUY MORE.: NEVER TRUE

## 2022-05-03 ASSESSMENT — LIFESTYLE VARIABLES
HOW OFTEN DO YOU HAVE A DRINK CONTAINING ALCOHOL: NEVER
HOW OFTEN DO YOU HAVE SIX OR MORE DRINKS ON ONE OCCASION: 1
HOW OFTEN DO YOU HAVE A DRINK CONTAINING ALCOHOL: 1

## 2022-05-03 ASSESSMENT — PATIENT HEALTH QUESTIONNAIRE - PHQ9
SUM OF ALL RESPONSES TO PHQ QUESTIONS 1-9: 0
SUM OF ALL RESPONSES TO PHQ QUESTIONS 1-9: 0
1. LITTLE INTEREST OR PLEASURE IN DOING THINGS: 0
SUM OF ALL RESPONSES TO PHQ QUESTIONS 1-9: 0
SUM OF ALL RESPONSES TO PHQ QUESTIONS 1-9: 0
2. FEELING DOWN, DEPRESSED OR HOPELESS: 0
SUM OF ALL RESPONSES TO PHQ9 QUESTIONS 1 & 2: 0

## 2022-05-03 ASSESSMENT — SOCIAL DETERMINANTS OF HEALTH (SDOH): HOW HARD IS IT FOR YOU TO PAY FOR THE VERY BASICS LIKE FOOD, HOUSING, MEDICAL CARE, AND HEATING?: NOT HARD AT ALL

## 2022-05-03 NOTE — PATIENT INSTRUCTIONS
Personalized Preventive Plan for Jair Erwin - 5/3/2022  Medicare offers a range of preventive health benefits. Some of the tests and screenings are paid in full while other may be subject to a deductible, co-insurance, and/or copay. Some of these benefits include a comprehensive review of your medical history including lifestyle, illnesses that may run in your family, and various assessments and screenings as appropriate. After reviewing your medical record and screening and assessments performed today your provider may have ordered immunizations, labs, imaging, and/or referrals for you. A list of these orders (if applicable) as well as your Preventive Care list are included within your After Visit Summary for your review. Other Preventive Recommendations:    · A preventive eye exam performed by an eye specialist is recommended every 1-2 years to screen for glaucoma; cataracts, macular degeneration, and other eye disorders. · A preventive dental visit is recommended every 6 months. · Try to get at least 150 minutes of exercise per week or 10,000 steps per day on a pedometer . · Order or download the FREE \"Exercise & Physical Activity: Your Everyday Guide\" from [employer]. Call 4-685.811.1266 or search [employer] online. · You need 7034-4006 mg of calcium and 3228-0481 IU of vitamin D per day. It is possible to meet your calcium requirement with diet alone, but a vitamin D supplement is usually necessary to meet this goal.  · When exposed to the sun, use a sunscreen that protects against both UVA and UVB radiation with an SPF of 30 or greater. Reapply every 2 to 3 hours or after sweating, drying off with a towel, or swimming. · Always wear a seat belt when traveling in a car. Always wear a helmet when riding a bicycle or motorcycle.

## 2022-05-03 NOTE — PROGRESS NOTES
The 10-year ASCVD risk score (Jacqui Fischer, et al., 2013) is: 5.3%    Values used to calculate the score:      Age: 78 years      Sex: Female      Is Non- : No      Diabetic: No      Tobacco smoker: No      Systolic Blood Pressure: 792 mmHg      Is BP treated: No      HDL Cholesterol: 69 mg/dL      Total Cholesterol: 211 mg/dL     Medicare Annual Wellness Visit    [address] is here for Cough (3 mon check- better. only happens now when it is cold ) and Hypothyroidism    Assessment & Plan   Welcome to Medicare preventive visit  Pt decline eye exam.  Was going to eye doctor tmw. Up to date on other screenings. PMH, meds, SH reviewed. Screenings as below. Pt ed on decreasing soda - has agreed to just 2 a day (decreased from 4-6). Acquired hypothyroidism  -     SYNTHROID 75 MCG tablet; Take 1 tablet by mouth Daily, Disp-90 tablet, R-1, DAWZERO refills remain on this prescription. Your patient is requesting advance approval of refills for this medication to [address] [place] [address] on current dose of meds. TSH in Feb WNL. Chronic cough  -     External Referral To Pulmonology  Unclear itelology of cough. PFT not normal - will send to pulm at [facility]       Recommendations for Preventive Services Due: see orders and patient instructions/AVS.  Recommended screening schedule for the next 5-10 years is provided to the patient in written form: see Patient Instructions/AVS.     Return in 6 months (on 11/3/2022) for cough. Subjective   The following acute and/or chronic problems were also addressed today:  Pt here for AWV. Hypothyroidism - last TSH in Feb 2022 was fine. She has had a thyroidectomy. Had PFT done - needed to see pulm. Did she see? Has not seen them. We did not refer (see phone notes).   This PFT is showing evidence of nonspecific spirometry with  severe air trapping with moderate reduction in diffusion capacity that  corrects for alveolar volume. Hx of breast ca. Hx of chronic cough. Use of albuterol? Not helping her with the chronic cough. Chewing gum helps. Never had a smoking hx. Occasionally has burning session and bad taste in mouth. Pepto helps. When she eats or drinks something cold or is in cold weather. That is when she coughs. Albuterol does not help even at this time. Breast ca - had mammo and pap in Nov of 2021. Sees Dr. Lars Newman in Feb 2022. Drinking lots of soda. Would like to cut down to 1-2 glasses of soda a day. Has OA in shoulders and back. Takes advil less than once a week. Increased urinary frequency. She had mesh put in. Has started to have increased frequency again. Will talk with Geoffrey about it. She had a macular hole and had it fixed but it was not fixed completely. vision in right eye is blurry. Retinal doctor. She has 30% in her right eye. Patient's complete Health Risk Assessment and screening values have been reviewed and are found in Flowsheets. The following problems were reviewed today and where indicated follow up appointments were made and/or referrals ordered.     Positive Risk Factor Screenings with Interventions:               General Health and ACP:  General  In general, how would you say your health is?: Good  In the past 7 days, have you experienced any of the following: New or Increased Pain, New or Increased Fatigue, Loneliness, Social Isolation, Stress or Anger?: No  Do you get the social and emotional support that you need?: Yes  Do you have a Living Will?: (!) No    Advance Directives     Power of  Living Will ACP-Advance Directive ACP-Power of     Not on File Not on File Not on File Not on File      General Health Risk Interventions:  · no issues    Health Habits/Nutrition:     Physical Activity: Inactive    Days of Exercise per Week: 0 days    Minutes of Exercise per Session: 0 min     Have you lost any weight without trying in the past 3 months?: No  Body mass index: (!) 26.17  Have you seen the dentist within the past year?: (!) No    Health Habits/Nutrition Interventions:  · Dental exam overdue:  patient declines dental evaluation  Ed pt on decreasing pop. Hearing/Vision:  Do you or your family notice any trouble with your hearing that hasn't been managed with hearing aids?: No  Do you have difficulty driving, watching TV, or doing any of your daily activities because of your eyesight?: (!) Yes  Have you had an eye exam within the past year?: Yes  No exam data present    Hearing/Vision Interventions:  · Vision concerns:  pt has appt tmw with eye specialist.  Sees regularly because of decreased vision in right eye. Objective   Vitals:    05/03/22 1345   BP: 122/77   Pulse: 77   Resp: 20   Temp: 97.8 °F (36.6 °C)   TempSrc: Temporal   SpO2: 97%   Weight: 134 lb (60.8 kg)   Height: 5' (1.524 m)      Body mass index is 26.17 kg/m². General Appearance: alert and oriented to person, place and time, well-developed and well-nourished, in no acute distress  Skin: warm and dry, no rash or erythema  Head: normocephalic and atraumatic  Neck: neck supple and non tender without mass, no thyroid felt or thyroid nodules, no cervical lymphadenopathy   Pulmonary/Chest: clear to auscultation bilaterally- no wheezes, rales or rhonchi, normal air movement, no respiratory distress  Cardiovascular: normal rate, normal S1 and S2, no gallops, intact distal pulses and no carotid bruits  Abdomen: soft, non-tender, non-distended, normal bowel sounds, no masses or organomegaly  Extremities: no edema       Allergies   Allergen Reactions    Pcn [Penicillins] Hives and Itching     Prior to Visit Medications    Medication Sig Taking?  Authorizing Provider   SYNTHROID 75 MCG tablet Take 1 tablet by mouth Daily Yes Erlinda Virk MD   albuterol sulfate HFA (VENTOLIN HFA) 108 (90 Base) MCG/ACT inhaler Inhale 1 puff into the lungs every 4 hours as needed for Wheezing OK substitute Yes Maris Corrales DO   CALCIUM-VITAMIN D PO Take 1 tablet by mouth 2 times daily. Calcium 600 mg  Vit D 1000mg Yes Historical Provider, MD   b complex vitamins tablet Take 1 tablet by mouth daily.  Yes Historical Provider, MD Sloan (Including outside providers/suppliers regularly involved in providing care):   Patient Care Team:  Oscar Avelar MD as PCP - General (Family Medicine)  Oscar Avelar MD as PCP - [facility] Empaneled Provider  Shy Farias DO (Obstetrics & Gynecology)    Reviewed and updated this visit:  Tobacco  Allergies  Meds  Problems  Med Hx  Surg Hx  Soc Hx  Fam Hx

## 2022-05-04 ASSESSMENT — LIFESTYLE VARIABLES: HOW OFTEN DO YOU HAVE A DRINK CONTAINING ALCOHOL: NEVER

## 2022-10-27 ENCOUNTER — OFFICE VISIT (OUTPATIENT)
Dept: OBGYN | Age: 67
End: 2022-10-27
Payer: MEDICARE

## 2022-10-27 VITALS
DIASTOLIC BLOOD PRESSURE: 74 MMHG | WEIGHT: 135.6 LBS | SYSTOLIC BLOOD PRESSURE: 126 MMHG | HEIGHT: 60 IN | HEART RATE: 99 BPM | BODY MASS INDEX: 26.62 KG/M2

## 2022-10-27 DIAGNOSIS — Z12.31 ENCOUNTER FOR SCREENING MAMMOGRAM FOR MALIGNANT NEOPLASM OF BREAST: ICD-10-CM

## 2022-10-27 DIAGNOSIS — Z01.419 ENCOUNTER FOR ROUTINE GYNECOLOGIC EXAMINATION IN MEDICARE PATIENT: Primary | ICD-10-CM

## 2022-10-27 PROCEDURE — G0101 CA SCREEN;PELVIC/BREAST EXAM: HCPCS | Performed by: OBSTETRICS & GYNECOLOGY

## 2022-10-27 PROCEDURE — 99203 OFFICE O/P NEW LOW 30 MIN: CPT | Performed by: OBSTETRICS & GYNECOLOGY

## 2022-10-27 RX ORDER — CHLORAL HYDRATE 500 MG
CAPSULE ORAL DAILY
COMMUNITY

## 2022-10-27 ASSESSMENT — ENCOUNTER SYMPTOMS
ABDOMINAL PAIN: 0
DIARRHEA: 0
CONSTIPATION: 0
COUGH: 0
BACK PAIN: 1
SHORTNESS OF BREATH: 0
VOMITING: 0
WHEEZING: 0
NAUSEA: 0
BLOOD IN STOOL: 0

## 2022-10-27 ASSESSMENT — PATIENT HEALTH QUESTIONNAIRE - PHQ9
2. FEELING DOWN, DEPRESSED OR HOPELESS: 0
SUM OF ALL RESPONSES TO PHQ QUESTIONS 1-9: 0
SUM OF ALL RESPONSES TO PHQ9 QUESTIONS 1 & 2: 0
SUM OF ALL RESPONSES TO PHQ QUESTIONS 1-9: 0
1. LITTLE INTEREST OR PLEASURE IN DOING THINGS: 0

## 2022-10-27 NOTE — PROGRESS NOTES
Np/annual prior dr Fernando Javier pt. Last mammogram was 11/11/21 wnl. Pt went through normal menopause around the age of [age] never had bleeding since. Pt is currently sexually active with no pain or discomfort.

## 2022-10-27 NOTE — PROGRESS NOTES
Elver Neil is a 58-year-old G2, P2 female who is postmenopausal since age 47 with no PMB. Her pregnancies were not complicated by HTN, GDM, PPH or PPD. Her last Pap 10/21/2021 positive HPV -16/18 a colposcopy was performed 11- mild atypia in the ECC. Mammogram 11- was normal.  DEXA 11- spine -1.7 T score. Cologuard was negative in 2021. She and her partner are not sexually active as he has multiple health issues. Family history paternal grandmother breast cancer. She denies anxiety depression or suicidal ideation. Has no history of physical sexual or verbal abuse.     Patient presents for annual exam.     Past Medical History:   Diagnosis Date    Asthma     Atypical ductal hyperplasia of right breast     Hypothyroidism     Osteopenia         Past Surgical History:   Procedure Laterality Date    APPENDECTOMY  around [facility]  2009    Sling    BREAST LUMPECTOMY Right 11/06/2019    RIGHT BREAST NEEDLE LOCALIZED LUMPECTOMY -- TRIDENT performed by Elbert Soler MD at [facility] Right 02/25/2016    Cleaned joint right great toe along with bone spurs, Dr. Lizzie Burgess  2010    CYST REMOVAL  03/2020    left eye     THYROIDECTOMY  around 1974    Over and underactive, large, small remnant        Family History   Problem Relation Age of Onset    Lung Cancer Father     Cancer Mother 36        skin cancer    Thyroid Disease Brother     Thyroid Disease Sister     No Known Problems Brother     Heart Failure Paternal Aunt     Breast Cancer Paternal Grandmother         age unknown    No Known Problems Daughter     No Known Problems Daughter           Current Outpatient Medications:     Omega-3 Fatty Acids (FISH OIL) 1000 MG capsule, Take by mouth daily, Disp: , Rfl:     SYNTHROID 75 MCG tablet, Take 1 tablet by mouth Daily, Disp: 90 tablet, Rfl: 1    albuterol sulfate HFA (VENTOLIN HFA) 108 (90 Base) MCG/ACT inhaler, Inhale 1 puff into the lungs every 4 hours as needed for Wheezing OK substitute, Disp: 1 Inhaler, Rfl: 3    CALCIUM-VITAMIN D PO, Take 1 tablet by mouth 2 times daily. Calcium 600 mg  Vit D 1000mg, Disp: , Rfl:     b complex vitamins tablet, Take 1 tablet by mouth daily. , Disp: , Rfl:      Allergies   Allergen Reactions    Pcn [Penicillins] Hives and Itching        Social History       Tobacco History       Smoking Status  Never      Smokeless Tobacco Use  Never              Alcohol History       Alcohol Use Status  No              Drug Use       Drug Use Status  No              Sexual Activity       Sexually Active  Not currently Partners  Male                     Review of Systems   Constitutional:  Negative for fever. HENT:  Negative for hearing loss. Eyes:  Negative for visual disturbance. Respiratory:  Negative for cough, shortness of breath and wheezing. Cardiovascular:  Negative for chest pain and palpitations. Gastrointestinal:  Negative for abdominal pain, blood in stool, constipation, diarrhea, nausea and vomiting. Genitourinary:  Positive for urgency (Not interfering in lifestyle). Negative for dysuria, enuresis, frequency and hematuria. Musculoskeletal:  Positive for arthralgias, back pain and myalgias. Skin:  Negative for rash. Neurological:  Negative for headaches. Hematological:  Does not bruise/bleed easily. Psychiatric/Behavioral:  Negative for agitation, dysphoric mood, self-injury, sleep disturbance and suicidal ideas. The patient is not nervous/anxious. Vitals:    10/27/22 1033   BP: 126/74   Pulse: 99        Physical Exam  Genitourinary:      Bladder, rectum and urethral meatus normal.      Right Labia: No Bartholin's cyst.     Left Labia: No Bartholin's cyst.     No labial fusion noted. No vaginal discharge or bleeding. No vaginal prolapse present. Mild vaginal atrophy present. Right Adnexa: not tender and no mass present. Left Adnexa: not tender and no mass present.      No cervical discharge, friability or lesion. Uterus is not enlarged, tender, irregular or prolapsed. No urethral prolapse present. Levator ani not tender and obturator internus not tender. Breasts:     Right: No inverted nipple, mass, nipple discharge, skin change or tenderness. Left: No inverted nipple, mass, nipple discharge, skin change or tenderness. Breast exam comments: Rt breast scar . Neck:      Thyroid: No thyroid mass or thyromegaly. Vascular: No carotid bruit. Cardiovascular:      Rate and Rhythm: Normal rate and regular rhythm. Pulmonary:      Effort: Pulmonary effort is normal.      Breath sounds: Normal breath sounds. Abdominal:      Palpations: Abdomen is soft. There is no mass. Tenderness: There is no abdominal tenderness. Hernia: No hernia is present. Lymphadenopathy:      Cervical: No cervical adenopathy. Upper Body:      Right upper body: No supraclavicular or axillary adenopathy. Left upper body: No supraclavicular or axillary adenopathy. Cm James was seen today for new patient. Diagnoses and all orders for this visit:    Encounter for routine gynecologic examination in Medicare patient  -     PAP SMEAR    Encounter for screening mammogram for malignant neoplasm of breast  -     DINAH CAD SCREENING; Future  Continue calcium vitamin D and weightbearing exercise. Asked her to report any PMB. She will be due for a Cologuard in 2024 a DEXA in 2023. We will call with Pap results. Return in about 1 year (around 10/27/2023).      Mustapha Slater,

## 2022-11-10 ENCOUNTER — TELEPHONE (OUTPATIENT)
Dept: OBGYN | Age: 67
End: 2022-11-10

## 2022-11-10 NOTE — TELEPHONE ENCOUNTER
----- Message from Kristian Raza DO sent at 11/9/2022 11:53 AM EST -----  Please let her know her Pap is normal

## 2022-11-11 ENCOUNTER — TELEPHONE (OUTPATIENT)
Dept: OBGYN | Age: 67
End: 2022-11-11

## 2022-11-11 NOTE — TELEPHONE ENCOUNTER
----- Message from Dolores Gibson DO sent at 11/9/2022 11:53 AM EST -----  Please let her know her Pap is normal

## 2022-11-14 ENCOUNTER — HOSPITAL ENCOUNTER (OUTPATIENT)
Dept: GENERAL RADIOLOGY | Age: 67
Discharge: HOME | End: 2022-11-16
Payer: MEDICARE

## 2022-11-14 DIAGNOSIS — Z12.31 ENCOUNTER FOR SCREENING MAMMOGRAM FOR MALIGNANT NEOPLASM OF BREAST: ICD-10-CM

## 2022-11-14 DIAGNOSIS — E03.9 ACQUIRED HYPOTHYROIDISM: ICD-10-CM

## 2022-11-14 PROCEDURE — 77063 BREAST TOMOSYNTHESIS BI: CPT

## 2022-11-14 RX ORDER — LEVOTHYROXINE SODIUM 75 MCG
75 TABLET ORAL DAILY
Qty: 90 TABLET | Refills: 1 | Status: SHIPPED | OUTPATIENT
Start: 2022-11-14

## 2022-11-21 ENCOUNTER — TELEPHONE (OUTPATIENT)
Dept: OBGYN | Age: 67
End: 2022-11-21

## 2022-11-21 NOTE — TELEPHONE ENCOUNTER
----- Message from Jose C Bajwa DO sent at 11/9/2022 11:53 AM EST -----  Please let her know her Pap is normal

## 2023-01-09 RX ORDER — MONTELUKAST SODIUM 10 MG/1
TABLET ORAL
COMMUNITY
Start: 2022-10-30 | End: 2023-01-10 | Stop reason: SDUPTHER

## 2023-01-09 NOTE — PROGRESS NOTES
[address] [facility] (:  1955) is a 78 y.o. female,Established patient, here for evaluation of the following chief complaint(s):  Hypothyroidism         ASSESSMENT/PLAN:  1. Acquired hypothyroidism  -     TSH; Future  TSH was found to be low. Synthroid 75 sent in for 4 days a week and 50 for 3 days a week. Pt to recheck TSH in 6 weeks. 2. Atypical ductal hyperplasia of right breast  Pt seeing GYN. Mammo was normal.  MRI recommended yearly per radiologist guidelines. 3. Elevated glucose  -     Hemoglobin A1C; Future  Ed pt regarding diet and exercise. 4. Moderate mixed hyperlipidemia not requiring statin therapy  -     LIPID PANEL; Future  -     Comprehensive Metabolic Panel; Future  ASCVD risk less than 7%. 5. Allergic rhinitis due to animal hair and dander  -     montelukast (SINGULAIR) 10 MG tablet; Take 1 tablet by mouth nightly, Disp-90 tablet, R-1Normal  Pt controlled on singular. Will also try atrovent nasal spray and see if this helps. Return in about 6 months (around 7/10/2023) for AWV in May. Subjective   SUBJECTIVE/OBJECTIVE:  HPI    Needs flu vaccine, pneumovax. Labs today. Had pap and mammo in Nov and both were fine - that is all the follow up needed. Does she need more than a mammo. Hypothyroidism -   Labs today    Hyperlipidemia  ? check labs? Yes. She takes singular - from asthma specialist.   She does not have asthma. Has cough with post-nasal drip. Ductal hyperplasia of right breast - sees GYN    Send in synthroid tmw    Review of Systems   Constitutional:  Positive for fatigue. Negative for activity change, appetite change, chills, diaphoresis and unexpected weight change. More fatigue since COVID vaccine  and booster   HENT:  Positive for congestion and postnasal drip. Eyes:  Negative for itching. Respiratory:  Positive for cough. Negative for shortness of breath and wheezing. She cont have cough with post-nasal drip. Cardiovascular:  Negative for chest pain, palpitations and leg swelling. Gastrointestinal:  Negative for abdominal pain, blood in stool, constipation and diarrhea. Genitourinary:  Negative for dysuria and vaginal bleeding. Skin:  Negative for rash. Neurological:  Negative for dizziness, speech difficulty, weakness, light-headedness and numbness. Hematological:  Bruises/bleeds easily. Objective   Physical Exam  Vitals and nursing note reviewed. Constitutional:       Appearance: Normal appearance. She is well-developed. She is not ill-appearing or toxic-appearing. HENT:      Head: Normocephalic and atraumatic. Eyes:      General: No scleral icterus. Right eye: No discharge. Left eye: No discharge. Neck:      Thyroid: No thyromegaly. Vascular: No carotid bruit. Cardiovascular:      Rate and Rhythm: Normal rate and regular rhythm. Heart sounds: Normal heart sounds, S1 normal and S2 normal. No murmur heard. No friction rub. No gallop. Pulmonary:      Effort: Pulmonary effort is normal. No respiratory distress. Breath sounds: Normal breath sounds and air entry. No decreased breath sounds, wheezing or rales. Abdominal:      Palpations: Abdomen is soft. Tenderness: There is no abdominal tenderness. Musculoskeletal:      Cervical back: Neck supple. Right lower leg: No edema. Left lower leg: No edema. Lymphadenopathy:      Cervical: No cervical adenopathy. Skin:     General: Skin is warm and dry. Neurological:      General: No focal deficit present. Mental Status: She is alert and oriented to person, place, and time. Gait: Gait normal.   Psychiatric:         Attention and Perception: Attention normal.         Mood and Affect: Mood and affect normal.         Speech: Speech normal.         Behavior: Behavior normal. Behavior is cooperative. Thought Content:  Thought content normal.         Cognition and Memory: Cognition and memory normal.         Judgment: Judgment normal.                An electronic signature was used to authenticate this note.     --Cayla Joseph MD

## 2023-01-10 ENCOUNTER — OFFICE VISIT (OUTPATIENT)
Dept: FAMILY MEDICINE CLINIC | Age: 68
End: 2023-01-10

## 2023-01-10 VITALS
RESPIRATION RATE: 16 BRPM | BODY MASS INDEX: 26.11 KG/M2 | OXYGEN SATURATION: 98 % | DIASTOLIC BLOOD PRESSURE: 75 MMHG | WEIGHT: 133 LBS | HEART RATE: 67 BPM | HEIGHT: 60 IN | TEMPERATURE: 97.4 F | SYSTOLIC BLOOD PRESSURE: 125 MMHG

## 2023-01-10 DIAGNOSIS — R73.09 ELEVATED GLUCOSE: ICD-10-CM

## 2023-01-10 DIAGNOSIS — E78.2 MODERATE MIXED HYPERLIPIDEMIA NOT REQUIRING STATIN THERAPY: ICD-10-CM

## 2023-01-10 DIAGNOSIS — N60.91 ATYPICAL DUCTAL HYPERPLASIA OF RIGHT BREAST: ICD-10-CM

## 2023-01-10 DIAGNOSIS — E03.9 ACQUIRED HYPOTHYROIDISM: Primary | ICD-10-CM

## 2023-01-10 DIAGNOSIS — J30.81 ALLERGIC RHINITIS DUE TO ANIMAL HAIR AND DANDER: ICD-10-CM

## 2023-01-10 DIAGNOSIS — E03.9 ACQUIRED HYPOTHYROIDISM: ICD-10-CM

## 2023-01-10 LAB
ALBUMIN SERPL-MCNC: 4.3 G/DL (ref 3.5–5.2)
ALP BLD-CCNC: 94 U/L (ref 35–104)
ALT SERPL-CCNC: 22 U/L (ref 0–32)
ANION GAP SERPL CALCULATED.3IONS-SCNC: 13 MMOL/L (ref 7–16)
AST SERPL-CCNC: 28 U/L (ref 0–31)
BILIRUB SERPL-MCNC: 0.3 MG/DL (ref 0–1.2)
BUN BLDV-MCNC: 15 MG/DL (ref 6–23)
CALCIUM SERPL-MCNC: 8.5 MG/DL (ref 8.6–10.2)
CHLORIDE BLD-SCNC: 98 MMOL/L (ref 98–107)
CHOLESTEROL, TOTAL: 229 MG/DL (ref 0–199)
CO2: 28 MMOL/L (ref 22–29)
CREAT SERPL-MCNC: 0.9 MG/DL (ref 0.5–1)
GFR SERPL CREATININE-BSD FRML MDRD: >60 ML/MIN/1.73
GLUCOSE BLD-MCNC: 96 MG/DL (ref 74–99)
HBA1C MFR BLD: 5.6 % (ref 4–5.6)
HDLC SERPL-MCNC: 76 MG/DL
LDL CHOLESTEROL CALCULATED: 119 MG/DL (ref 0–99)
POTASSIUM SERPL-SCNC: 4.1 MMOL/L (ref 3.5–5)
SODIUM BLD-SCNC: 139 MMOL/L (ref 132–146)
TOTAL PROTEIN: 7 G/DL (ref 6.4–8.3)
TRIGL SERPL-MCNC: 172 MG/DL (ref 0–149)
TSH SERPL DL<=0.05 MIU/L-ACNC: 0.18 UIU/ML (ref 0.27–4.2)
VLDLC SERPL CALC-MCNC: 34 MG/DL

## 2023-01-10 RX ORDER — IPRATROPIUM BROMIDE 42 UG/1
2 SPRAY, METERED NASAL 4 TIMES DAILY
Qty: 15 ML | Refills: 3 | Status: SHIPPED | OUTPATIENT
Start: 2023-01-10

## 2023-01-10 RX ORDER — MONTELUKAST SODIUM 10 MG/1
10 TABLET ORAL NIGHTLY
Qty: 90 TABLET | Refills: 1 | Status: SHIPPED | OUTPATIENT
Start: 2023-01-10

## 2023-01-10 ASSESSMENT — ENCOUNTER SYMPTOMS
CONSTIPATION: 0
WHEEZING: 0
EYE ITCHING: 0
SHORTNESS OF BREATH: 0
COUGH: 1
BLOOD IN STOOL: 0
DIARRHEA: 0
ABDOMINAL PAIN: 0

## 2023-01-10 ASSESSMENT — PATIENT HEALTH QUESTIONNAIRE - PHQ9
1. LITTLE INTEREST OR PLEASURE IN DOING THINGS: 0
SUM OF ALL RESPONSES TO PHQ QUESTIONS 1-9: 0
2. FEELING DOWN, DEPRESSED OR HOPELESS: 0
SUM OF ALL RESPONSES TO PHQ9 QUESTIONS 1 & 2: 0
SUM OF ALL RESPONSES TO PHQ QUESTIONS 1-9: 0

## 2023-01-11 ENCOUNTER — TELEPHONE (OUTPATIENT)
Dept: FAMILY MEDICINE CLINIC | Age: 68
End: 2023-01-11

## 2023-01-11 DIAGNOSIS — E03.9 ACQUIRED HYPOTHYROIDISM: ICD-10-CM

## 2023-01-11 RX ORDER — LEVOTHYROXINE SODIUM 75 MCG
75 TABLET ORAL SEE ADMIN INSTRUCTIONS
Qty: 48 TABLET | Refills: 1 | Status: SHIPPED | OUTPATIENT
Start: 2023-01-11

## 2023-01-11 RX ORDER — LEVOTHYROXINE SODIUM 50 MCG
50 TABLET ORAL SEE ADMIN INSTRUCTIONS
Qty: 30 TABLET | Refills: 3 | Status: SHIPPED | OUTPATIENT
Start: 2023-01-11

## 2023-01-11 NOTE — TELEPHONE ENCOUNTER
Tried to call patient to give her results of her labs. Her cholesterol is up a bit more. Please watch her carbs and increase her exercising. Her thyroid level is now at a level that shows she is taking to much medicine. She has felt poorly taking 50mcg in the past so lets take 75 mcg on T/Th/S/S and 50 mcg on M/W/F. Hopefully this will do the trick. We can recheck her labs in about 6 weeks. I have put the lab in her chart. I sent a [employer] message regarding this as well and have sent in her meds.      The 10-year ASCVD risk score (Amy DK, et al., 2019) is: 5.6%    Values used to calculate the score:      Age: 78 years      Sex: Female      Is Non- : No      Diabetic: No      Tobacco smoker: No      Systolic Blood Pressure: 612 mmHg      Is BP treated: No      HDL Cholesterol: 76 mg/dL      Total Cholesterol: 229 mg/dL

## 2023-01-12 ENCOUNTER — TELEPHONE (OUTPATIENT)
Dept: FAMILY MEDICINE CLINIC | Age: 68
End: 2023-01-12

## 2023-01-12 DIAGNOSIS — N60.99 ATYPICAL DUCTAL HYPERPLASIA OF BREAST: Primary | ICD-10-CM

## 2023-01-12 DIAGNOSIS — N64.89 OTHER SPECIFIED DISORDERS OF BREAST: ICD-10-CM

## 2023-01-12 NOTE — TELEPHONE ENCOUNTER
I called and let the patient know that I left her a few messages in [employer]. Her name is not on the phone message so I did not feel comfortable leaving a more specific message.   mojgan

## 2023-01-12 NOTE — PROGRESS NOTES
[address] [facility] - MRI of breast needs to be with and without contrast.

## 2023-01-26 ENCOUNTER — CLINICAL DOCUMENTATION (OUTPATIENT)
Dept: GENERAL RADIOLOGY | Age: 68
End: 2023-01-26

## 2023-02-08 ENCOUNTER — HOSPITAL ENCOUNTER (OUTPATIENT)
Dept: MRI IMAGING | Age: 68
Discharge: HOME | End: 2023-02-10
Payer: MEDICARE

## 2023-02-08 DIAGNOSIS — N60.99 ATYPICAL DUCTAL HYPERPLASIA OF BREAST: ICD-10-CM

## 2023-02-08 DIAGNOSIS — N64.89 OTHER SPECIFIED DISORDERS OF BREAST: ICD-10-CM

## 2023-02-08 PROCEDURE — 6360000004 HC RX CONTRAST MEDICATION: Performed by: RADIOLOGY

## 2023-02-08 PROCEDURE — A9585 GADOBUTROL INJECTION: HCPCS | Performed by: RADIOLOGY

## 2023-02-08 PROCEDURE — C8908 MRI W/O FOL W/CONT, BREAST,: HCPCS

## 2023-02-08 RX ADMIN — GADOBUTROL 6 ML: 604.72 INJECTION INTRAVENOUS at 16:18

## 2023-02-20 ENCOUNTER — HOSPITAL ENCOUNTER (OUTPATIENT)
Age: 68
Discharge: HOME | End: 2023-02-20
Payer: MEDICARE

## 2023-02-20 DIAGNOSIS — E03.9 ACQUIRED HYPOTHYROIDISM: ICD-10-CM

## 2023-02-20 LAB — TSH SERPL DL<=0.05 MIU/L-ACNC: 0.46 UIU/ML (ref 0.27–4.2)

## 2023-02-20 PROCEDURE — 36415 COLL VENOUS BLD VENIPUNCTURE: CPT

## 2023-02-20 PROCEDURE — 84443 ASSAY THYROID STIM HORMONE: CPT

## 2023-02-21 ENCOUNTER — TELEPHONE (OUTPATIENT)
Dept: FAMILY MEDICINE CLINIC | Age: 68
End: 2023-02-21

## 2023-02-21 DIAGNOSIS — E03.9 ACQUIRED HYPOTHYROIDISM: ICD-10-CM

## 2023-02-21 RX ORDER — LEVOTHYROXINE SODIUM 50 MCG
50 TABLET ORAL DAILY
Qty: 90 TABLET | Refills: 0 | Status: SHIPPED | OUTPATIENT
Start: 2023-02-21

## 2023-02-21 NOTE — TELEPHONE ENCOUNTER
Please contact patient. Her thyroid level shows that she is taking to much thyroid hormone. Please start taking the 50 mcg daily and stop the 75 mcg. We should recheck her thyroid level in 6-8 weeks. Thanks.    mojgan

## 2023-04-18 ENCOUNTER — HOSPITAL ENCOUNTER (OUTPATIENT)
Age: 68
Discharge: HOME | End: 2023-04-18
Payer: MEDICARE

## 2023-04-18 DIAGNOSIS — E03.9 ACQUIRED HYPOTHYROIDISM: ICD-10-CM

## 2023-04-18 LAB — TSH SERPL-MCNC: 1.8 UIU/ML (ref 0.27–4.2)

## 2023-04-18 PROCEDURE — 36415 COLL VENOUS BLD VENIPUNCTURE: CPT

## 2023-04-18 PROCEDURE — 84443 ASSAY THYROID STIM HORMONE: CPT

## 2023-05-13 SDOH — HEALTH STABILITY: PHYSICAL HEALTH: ON AVERAGE, HOW MANY DAYS PER WEEK DO YOU ENGAGE IN MODERATE TO STRENUOUS EXERCISE (LIKE A BRISK WALK)?: 4 DAYS

## 2023-05-13 SDOH — ECONOMIC STABILITY: INCOME INSECURITY: HOW HARD IS IT FOR YOU TO PAY FOR THE VERY BASICS LIKE FOOD, HOUSING, MEDICAL CARE, AND HEATING?: NOT HARD AT ALL

## 2023-05-13 SDOH — ECONOMIC STABILITY: HOUSING INSECURITY
IN THE LAST 12 MONTHS, WAS THERE A TIME WHEN YOU DID NOT HAVE A STEADY PLACE TO SLEEP OR SLEPT IN A SHELTER (INCLUDING NOW)?: NO

## 2023-05-13 SDOH — ECONOMIC STABILITY: FOOD INSECURITY: WITHIN THE PAST 12 MONTHS, THE FOOD YOU BOUGHT JUST DIDN'T LAST AND YOU DIDN'T HAVE MONEY TO GET MORE.: NEVER TRUE

## 2023-05-13 SDOH — ECONOMIC STABILITY: TRANSPORTATION INSECURITY
IN THE PAST 12 MONTHS, HAS LACK OF TRANSPORTATION KEPT YOU FROM MEETINGS, WORK, OR FROM GETTING THINGS NEEDED FOR DAILY LIVING?: NO

## 2023-05-13 SDOH — HEALTH STABILITY: PHYSICAL HEALTH: ON AVERAGE, HOW MANY MINUTES DO YOU ENGAGE IN EXERCISE AT THIS LEVEL?: 20 MIN

## 2023-05-13 SDOH — ECONOMIC STABILITY: FOOD INSECURITY: WITHIN THE PAST 12 MONTHS, YOU WORRIED THAT YOUR FOOD WOULD RUN OUT BEFORE YOU GOT MONEY TO BUY MORE.: NEVER TRUE

## 2023-05-13 ASSESSMENT — PATIENT HEALTH QUESTIONNAIRE - PHQ9
SUM OF ALL RESPONSES TO PHQ QUESTIONS 1-9: 0
SUM OF ALL RESPONSES TO PHQ QUESTIONS 1-9: 0
SUM OF ALL RESPONSES TO PHQ9 QUESTIONS 1 & 2: 0
SUM OF ALL RESPONSES TO PHQ QUESTIONS 1-9: 0
2. FEELING DOWN, DEPRESSED OR HOPELESS: 0
SUM OF ALL RESPONSES TO PHQ QUESTIONS 1-9: 0
1. LITTLE INTEREST OR PLEASURE IN DOING THINGS: 0

## 2023-05-13 ASSESSMENT — LIFESTYLE VARIABLES
HOW MANY STANDARD DRINKS CONTAINING ALCOHOL DO YOU HAVE ON A TYPICAL DAY: PATIENT DOES NOT DRINK
HOW OFTEN DO YOU HAVE A DRINK CONTAINING ALCOHOL: NEVER
HOW OFTEN DO YOU HAVE SIX OR MORE DRINKS ON ONE OCCASION: 1
HOW OFTEN DO YOU HAVE A DRINK CONTAINING ALCOHOL: 1
HOW MANY STANDARD DRINKS CONTAINING ALCOHOL DO YOU HAVE ON A TYPICAL DAY: 0

## 2023-05-16 ENCOUNTER — OFFICE VISIT (OUTPATIENT)
Dept: FAMILY MEDICINE CLINIC | Age: 68
End: 2023-05-16
Payer: MEDICARE

## 2023-05-16 VITALS
RESPIRATION RATE: 18 BRPM | DIASTOLIC BLOOD PRESSURE: 62 MMHG | HEART RATE: 65 BPM | TEMPERATURE: 97.3 F | BODY MASS INDEX: 25.68 KG/M2 | SYSTOLIC BLOOD PRESSURE: 118 MMHG | OXYGEN SATURATION: 98 % | HEIGHT: 61 IN | WEIGHT: 136 LBS

## 2023-05-16 DIAGNOSIS — J30.9 ALLERGIC RHINITIS, UNSPECIFIED SEASONALITY, UNSPECIFIED TRIGGER: ICD-10-CM

## 2023-05-16 DIAGNOSIS — J30.81 ALLERGIC RHINITIS DUE TO ANIMAL HAIR AND DANDER: ICD-10-CM

## 2023-05-16 DIAGNOSIS — Z00.00 INITIAL MEDICARE ANNUAL WELLNESS VISIT: Primary | ICD-10-CM

## 2023-05-16 DIAGNOSIS — E03.9 ACQUIRED HYPOTHYROIDISM: ICD-10-CM

## 2023-05-16 PROCEDURE — G0439 PPPS, SUBSEQ VISIT: HCPCS | Performed by: FAMILY MEDICINE

## 2023-05-16 PROCEDURE — 3017F COLORECTAL CA SCREEN DOC REV: CPT | Performed by: FAMILY MEDICINE

## 2023-05-16 PROCEDURE — 1123F ACP DISCUSS/DSCN MKR DOCD: CPT | Performed by: FAMILY MEDICINE

## 2023-05-16 RX ORDER — MONTELUKAST SODIUM 10 MG/1
10 TABLET ORAL NIGHTLY
Qty: 90 TABLET | Refills: 1 | Status: SHIPPED | OUTPATIENT
Start: 2023-05-16

## 2023-05-16 RX ORDER — LEVOTHYROXINE SODIUM 50 MCG
50 TABLET ORAL DAILY
Qty: 30 TABLET | Refills: 5 | Status: SHIPPED | OUTPATIENT
Start: 2023-05-16

## 2023-05-16 RX ORDER — IPRATROPIUM BROMIDE 42 UG/1
2 SPRAY, METERED NASAL 4 TIMES DAILY
Qty: 15 ML | Refills: 3 | Status: SHIPPED | OUTPATIENT
Start: 2023-05-16

## 2023-05-16 RX ORDER — LORATADINE 10 MG/1
10 TABLET ORAL DAILY
Qty: 90 TABLET | Refills: 1 | Status: SHIPPED | OUTPATIENT
Start: 2023-05-16

## 2023-05-16 NOTE — PROGRESS NOTES
Medicare Annual Wellness Visit    Luis Enrique Camarillo is here for Medicare AWV    Assessment & Plan   Initial Medicare annual wellness visit  Recommendations for Preventive Services Due: see orders and patient instructions/AVS.  Recommended screening schedule for the next 5-10 years is provided to the patient in written form: see Patient Instructions/AVS.  Pt given pneumovax vaccine today. Will get mammogram this summer. UTD on DEXA, colon cancer screening. Labs WNL    Acquired hypothyroidism  -     SYNTHROID 50 MCG tablet; Take 1 tablet by mouth Daily, Disp-30 tablet, R-5, DAWNormal  -     TSH; Future  Doing well on current dose. Will recheck in 1 year. Allergic rhinitis, unspecified seasonality, unspecified trigger  -     loratadine (CLARITIN) 10 MG tablet; Take 1 tablet by mouth daily, Disp-90 tablet, R-1Normal  Allergic rhinitis due to animal hair and dander  -     montelukast (SINGULAIR) 10 MG tablet; Take 1 tablet by mouth nightly, Disp-90 tablet, R-1Normal           Return in 6 months (on 11/16/2023) for Thyroid disease. Subjective   The following acute and/or chronic problems were also addressed today:    Patient's complete Health Risk Assessment and screening values have been reviewed and are found in Flowsheets. The following problems were reviewed today and where indicated follow up appointments were made and/or referrals ordered. Here for AWV. Hypothyroid - TSH 4 weeks ago WNL. She felt more tired at the start of new dose but now ok. Chronic cough/allergies- med update. Needing to take some loratidine occasionally. Had ducts cleaned and uses hepa filter. Takes singular nightly. Osteopenia - vit D? Yes with calcium Exercise? Brisk walking, working out in the yard. DEXA in 2021. Needs cataract surgery in eye that she had the problem with the macular hole and the retina tear that occurred with surgery. No paperwork needed sign.       The 10-year ASCVD risk score (Amy DK, et

## 2023-05-16 NOTE — PATIENT INSTRUCTIONS
or do other activities. Do not smoke. If you need help quitting, talk to your doctor about stop-smoking programs and medicines. These can increase your chances of quitting for good. Quitting smoking may be the most important step you can take to protect your heart. It is never too late to quit. Limit alcohol to 2 drinks a day for men and 1 drink a day for women. Too much alcohol can cause health problems. Manage other health problems such as diabetes, high blood pressure, and high cholesterol. If you think you may have a problem with alcohol or drug use, talk to your doctor. Medicines    Take your medicines exactly as prescribed. Call your doctor if you think you are having a problem with your medicine. If your doctor recommends aspirin, take the amount directed each day. Make sure you take aspirin and not another kind of pain reliever, such as acetaminophen (Tylenol). When should you call for help? Call 911 if you have symptoms of a heart attack. These may include:    Chest pain or pressure, or a strange feeling in the chest.     Sweating. Shortness of breath. Pain, pressure, or a strange feeling in the back, neck, jaw, or upper belly or in one or both shoulders or arms. Lightheadedness or sudden weakness. A fast or irregular heartbeat. After you call 911, the  may tell you to chew 1 adult-strength or 2 to 4 low-dose aspirin. Wait for an ambulance. Do not try to drive yourself. Watch closely for changes in your health, and be sure to contact your doctor if you have any problems. Where can you learn more? Go to http://www.davis.com/ and enter F075 to learn more about \"A Healthy Heart: Care Instructions. \"  Current as of: September 7, 2022               Content Version: 13.6  © 3079-6910 [employer], M87. Care instructions adapted under license by [facility] ([facility]).  If you have questions about a medical condition or this instruction, always ask

## 2023-05-17 PROBLEM — R45.4 IRRITABILITY: Status: RESOLVED | Noted: 2017-03-29 | Resolved: 2023-05-17

## 2023-05-17 ASSESSMENT — ENCOUNTER SYMPTOMS
DIARRHEA: 0
ABDOMINAL PAIN: 0
CHEST TIGHTNESS: 0
COUGH: 1
WHEEZING: 0
CONSTIPATION: 0

## 2023-06-21 ENCOUNTER — HOSPITAL ENCOUNTER (OUTPATIENT)
Age: 68
Discharge: HOME | End: 2023-06-21
Payer: MEDICARE

## 2023-06-21 DIAGNOSIS — E55.9 VITAMIN D DEFICIENCY, UNSPECIFIED: ICD-10-CM

## 2023-06-21 DIAGNOSIS — R53.83 FATIGUE, UNSPECIFIED TYPE: Primary | ICD-10-CM

## 2023-06-21 DIAGNOSIS — R53.83 FATIGUE, UNSPECIFIED TYPE: ICD-10-CM

## 2023-06-21 LAB
ALBUMIN SERPL-MCNC: 4.5 G/DL (ref 3.5–5.2)
ALP SERPL-CCNC: 102 U/L (ref 35–104)
ALT SERPL-CCNC: 23 U/L (ref 0–32)
ANION GAP SERPL CALCULATED.3IONS-SCNC: 14 MMOL/L (ref 7–16)
AST SERPL-CCNC: 29 U/L (ref 0–31)
BASOPHILS # BLD: 0.05 E9/L (ref 0–0.2)
BASOPHILS NFR BLD: 0.9 % (ref 0–2)
BILIRUB SERPL-MCNC: 0.4 MG/DL (ref 0–1.2)
BUN SERPL-MCNC: 14 MG/DL (ref 6–23)
CALCIUM SERPL-MCNC: 8.4 MG/DL (ref 8.6–10.2)
CHLORIDE SERPL-SCNC: 105 MMOL/L (ref 98–107)
CO2 SERPL-SCNC: 23 MMOL/L (ref 22–29)
CREAT SERPL-MCNC: 0.9 MG/DL (ref 0.5–1)
EOSINOPHIL # BLD: 0.3 E9/L (ref 0.05–0.5)
EOSINOPHIL NFR BLD: 5.2 % (ref 0–6)
ERYTHROCYTE [DISTWIDTH] IN BLOOD BY AUTOMATED COUNT: 13.2 FL (ref 11.5–15)
GLUCOSE SERPL-MCNC: 95 MG/DL (ref 74–99)
HCT VFR BLD AUTO: 38.1 % (ref 34–48)
HGB BLD-MCNC: 12.9 G/DL (ref 11.5–15.5)
IMM GRANULOCYTES # BLD: 0.01 E9/L
IMM GRANULOCYTES NFR BLD: 0.2 % (ref 0–5)
LYMPHOCYTES # BLD: 2 E9/L (ref 1.5–4)
LYMPHOCYTES NFR BLD: 34.5 % (ref 20–42)
MCH RBC QN AUTO: 29.9 PG (ref 26–35)
MCHC RBC AUTO-ENTMCNC: 33.9 % (ref 32–34.5)
MCV RBC AUTO: 88.4 FL (ref 80–99.9)
MONOCYTES # BLD: 0.41 E9/L (ref 0.1–0.95)
MONOCYTES NFR BLD: 7.1 % (ref 2–12)
NEUTROPHILS # BLD: 3.02 E9/L (ref 1.8–7.3)
NEUTS SEG NFR BLD: 52.1 % (ref 43–80)
PLATELET # BLD AUTO: 289 E9/L (ref 130–450)
PMV BLD AUTO: 10.5 FL (ref 7–12)
POTASSIUM SERPL-SCNC: 4 MMOL/L (ref 3.5–5)
PROT SERPL-MCNC: 7.1 G/DL (ref 6.4–8.3)
RBC # BLD AUTO: 4.31 E12/L (ref 3.5–5.5)
SODIUM SERPL-SCNC: 142 MMOL/L (ref 132–146)
TSH SERPL-MCNC: 2.65 UIU/ML (ref 0.27–4.2)
VITAMIN D 25-HYDROXY: 31 NG/ML (ref 30–100)
WBC # BLD: 5.8 E9/L (ref 4.5–11.5)

## 2023-06-21 PROCEDURE — 82306 VITAMIN D 25 HYDROXY: CPT

## 2023-06-21 PROCEDURE — 85025 COMPLETE CBC W/AUTO DIFF WBC: CPT

## 2023-06-21 PROCEDURE — 36415 COLL VENOUS BLD VENIPUNCTURE: CPT

## 2023-06-21 PROCEDURE — 84443 ASSAY THYROID STIM HORMONE: CPT

## 2023-06-21 PROCEDURE — 80053 COMPREHEN METABOLIC PANEL: CPT

## 2023-06-21 NOTE — PROGRESS NOTES
Labs ordered. Pt with increased fatigue. Have asked her to make appt for this Friday, 8:30. Virtual visit is ok.   lnw

## 2023-10-26 ENCOUNTER — OFFICE VISIT (OUTPATIENT)
Dept: OBGYN | Age: 68
End: 2023-10-26
Payer: MEDICARE

## 2023-10-26 VITALS
WEIGHT: 133.6 LBS | SYSTOLIC BLOOD PRESSURE: 123 MMHG | OXYGEN SATURATION: 96 % | BODY MASS INDEX: 25.24 KG/M2 | HEART RATE: 66 BPM | DIASTOLIC BLOOD PRESSURE: 80 MMHG

## 2023-10-26 DIAGNOSIS — Z12.31 SCREENING MAMMOGRAM, ENCOUNTER FOR: ICD-10-CM

## 2023-10-26 DIAGNOSIS — R10.2 PELVIC PAIN: ICD-10-CM

## 2023-10-26 DIAGNOSIS — Z01.419 ENCOUNTER FOR GYNECOLOGICAL EXAMINATION: Primary | ICD-10-CM

## 2023-10-26 DIAGNOSIS — M85.80 OSTEOPENIA, UNSPECIFIED LOCATION: ICD-10-CM

## 2023-10-26 DIAGNOSIS — B97.7 HPV IN FEMALE: ICD-10-CM

## 2023-10-26 DIAGNOSIS — N60.91 ATYPICAL DUCTAL HYPERPLASIA OF RIGHT BREAST: ICD-10-CM

## 2023-10-26 LAB
BILIRUBIN URINE: NEGATIVE
COLOR: YELLOW
EPITHELIAL CELLS UA: ABNORMAL /HPF
GLUCOSE URINE: NEGATIVE MG/DL
KETONES, URINE: NEGATIVE MG/DL
LEUKOCYTE ESTERASE, URINE: ABNORMAL
NITRITE, URINE: NEGATIVE
PH UA: 6 (ref 5–9)
PROTEIN UA: NEGATIVE MG/DL
RBC UA: ABNORMAL /HPF
SPECIFIC GRAVITY UA: <1.005 (ref 1–1.03)
TURBIDITY: CLEAR
URINE HGB: NEGATIVE
UROBILINOGEN, URINE: 0.2 EU/DL (ref 0–1)
WBC UA: ABNORMAL /HPF

## 2023-10-26 PROCEDURE — 99203 OFFICE O/P NEW LOW 30 MIN: CPT | Performed by: OBSTETRICS & GYNECOLOGY

## 2023-10-26 NOTE — PROGRESS NOTES
Pap smear and urine obtained, labeled and sent to the lab. Directed to the lab to obtain additional ordered blood work.
Patient is alert and pleasant. Patient last Gyno was Dr. Francisco Javier Toure. Last pap was 10/27/22. Last mammogram was 1/12/23. Patient states she has been having slight pain in right lower abdomen. The pain comes and goes and at it's worst it's a 6 on a 0-10 pain scale.
suspicious lesions  Neurologic: Alert and oriented, no focal deficits  Extremities: symmetrical without clubbing or cynosis  Psych: Alert, oriented, mood/affect full range, no acute distress    Pelvic Exam:   EXTERNAL GENITALIA: normal external genitalia, no external lesions  VAGINA: normal rugae, no discharge   CERVIX: normal appearing, no lesions, no bleeding  UTERUS: uterus is normal size, shape, consistency and nontender   ADNEXA: normal adnexa in size, nontender and no masses. RECTUM: no hemorrhoids or rectal masses. ASSESSMENT : Routine Annual Exam,    Diagnosis Orders   1. Encounter for gynecological examination  PAP SMEAR    DINAH DIGITAL SCREEN W OR WO CAD BILATERAL    MRI BREAST BILATERAL W WO CONTRAST    DEXA BONE DENSITY AXIAL SKELETON      2. Screening mammogram, encounter for  DINAH DIGITAL SCREEN W OR WO CAD BILATERAL    MRI BREAST BILATERAL W WO CONTRAST      3. Osteopenia, unspecified location  DEXA BONE DENSITY AXIAL SKELETON      4. HPV in female  PAP SMEAR      5. Atypical ductal hyperplasia of right breast  MRI BREAST BILATERAL W WO CONTRAST      6. Pelvic pain  Culture, Urine    Urinalysis    US NON OB TRANSVAGINAL           PLAN:    Return in about 4 weeks (around 11/23/2023) for Review results. Continue calcium and vitamin D. Cologuard in 2024  No orders of the defined types were placed in this encounter. Orders Placed This Encounter   Procedures    Culture, Urine     Standing Status:   Future     Standing Expiration Date:   10/26/2024     Order Specific Question:   Specify (ex-cath, midstream, cysto, etc)?      Answer:   midstream    DINAH DIGITAL SCREEN W OR WO CAD BILATERAL     Further imaging can be completed per [facility] protocol     Standing Status:   Future     Standing Expiration Date:   12/26/2024     Order Specific Question:   Reason for exam:     Answer:   screening mammogram    MRI BREAST BILATERAL W WO CONTRAST     Standing Status:   Future     Standing

## 2023-10-27 ENCOUNTER — TELEPHONE (OUTPATIENT)
Dept: GENERAL RADIOLOGY | Age: 68
End: 2023-10-27

## 2023-10-28 LAB
CULTURE: NORMAL
SPECIMEN DESCRIPTION: NORMAL

## 2023-11-01 ENCOUNTER — PATIENT MESSAGE (OUTPATIENT)
Dept: OBGYN | Age: 68
End: 2023-11-01

## 2023-11-01 ENCOUNTER — TELEPHONE (OUTPATIENT)
Dept: OBGYN | Age: 68
End: 2023-11-01

## 2023-11-01 DIAGNOSIS — N30.01 ACUTE CYSTITIS WITH HEMATURIA: Primary | ICD-10-CM

## 2023-11-01 LAB — GYNECOLOGY CYTOLOGY REPORT: NORMAL

## 2023-11-01 RX ORDER — NITROFURANTOIN 25; 75 MG/1; MG/1
100 CAPSULE ORAL 2 TIMES DAILY
Qty: 14 CAPSULE | Refills: 0 | Status: SHIPPED | OUTPATIENT
Start: 2023-11-01 | End: 2023-11-08

## 2023-11-01 NOTE — TELEPHONE ENCOUNTER
Notified patient via [employer] of UTI script called in and need to schedule appt for 2 weeks for repeat pap and test of cure. Steffanie Kawasaki said she will also call the patient.

## 2023-11-01 NOTE — TELEPHONE ENCOUNTER
From: Tony Erwin  To: Dr. Drake Blevins: 11/1/2023 12:18 PM EDT  Subject: Test results     [facility]  Just wanted to know how my urine and pap tests came out? I looked at [employer] and don't understand what it says. Should I be concerned about anything?   Thanks for any information   [employer]

## 2023-11-01 NOTE — TELEPHONE ENCOUNTER
Pap was unsatisfactory. She needs a repeat pap at next appointment. I believe I sent a message to elier earlier today about this. As for the urine, that result was never sent to me. I reviewed it and it looks like she may have a UTI. I will send in an antibiotic and then she will need to do a test of cure after finishing the antibiotic. Thank you.

## 2023-11-01 NOTE — TELEPHONE ENCOUNTER
----- Message from Syd Echeverria DO sent at 11/1/2023 12:14 PM EDT -----  Pap unsatisfactory. Please schedule for repeat pap. Thank you.

## 2023-11-06 DIAGNOSIS — Z01.812 BLOOD TESTS PRIOR TO TREATMENT OR PROCEDURE: Primary | ICD-10-CM

## 2023-11-07 LAB
GYNECOLOGY CYTOLOGY REPORT: NORMAL
HPV SAMPLE: ABNORMAL
HPV SOURCE: ABNORMAL
HPV, GENOTYPE 16: ABNORMAL
HPV, GENOTYPE 18: ABNORMAL
HPV, HIGH RISK OTHER: ABNORMAL
HPV, INTERPRETATION: ABNORMAL

## 2023-11-08 ENCOUNTER — CLINICAL DOCUMENTATION (OUTPATIENT)
Dept: GENERAL RADIOLOGY | Age: 68
End: 2023-11-08

## 2023-11-08 NOTE — PROGRESS NOTES
Patient called to schedule breast MRI, mammogram, and dexa scan. I returned patient's call. She had last breast MRI 2-8-23. Patient's primary insurance is Medicare. Patient to be scheduled for breast MRI in 2024, other orders given to scheduling. Patient is aware labs will need to be completed within 30 days of breast MRI.

## 2023-11-16 ENCOUNTER — OFFICE VISIT (OUTPATIENT)
Dept: OBGYN | Age: 68
End: 2023-11-16
Payer: MEDICARE

## 2023-11-16 VITALS
SYSTOLIC BLOOD PRESSURE: 125 MMHG | WEIGHT: 134.1 LBS | BODY MASS INDEX: 25.34 KG/M2 | DIASTOLIC BLOOD PRESSURE: 74 MMHG | HEART RATE: 112 BPM

## 2023-11-16 DIAGNOSIS — R87.615 UNSATISFACTORY CERVICAL CYTOLOGY SMEAR: Primary | ICD-10-CM

## 2023-11-16 DIAGNOSIS — R30.0 DYSURIA: ICD-10-CM

## 2023-11-16 DIAGNOSIS — N30.01 ACUTE CYSTITIS WITH HEMATURIA: ICD-10-CM

## 2023-11-16 LAB
BILIRUBIN URINE: NEGATIVE
COLOR: YELLOW
COMMENT: NORMAL
GLUCOSE URINE: NEGATIVE MG/DL
KETONES, URINE: NEGATIVE MG/DL
LEUKOCYTE ESTERASE, URINE: NEGATIVE
NITRITE, URINE: NEGATIVE
PH UA: 6.5 (ref 5–9)
PROTEIN UA: NEGATIVE MG/DL
SPECIFIC GRAVITY UA: 1.01 (ref 1–1.03)
TURBIDITY: CLEAR
URINE HGB: NEGATIVE
UROBILINOGEN, URINE: 0.2 EU/DL (ref 0–1)

## 2023-11-16 PROCEDURE — 99213 OFFICE O/P EST LOW 20 MIN: CPT | Performed by: OBSTETRICS & GYNECOLOGY

## 2023-11-16 PROCEDURE — G8399 PT W/DXA RESULTS DOCUMENT: HCPCS | Performed by: OBSTETRICS & GYNECOLOGY

## 2023-11-16 PROCEDURE — 1090F PRES/ABSN URINE INCON ASSESS: CPT | Performed by: OBSTETRICS & GYNECOLOGY

## 2023-11-16 PROCEDURE — 3017F COLORECTAL CA SCREEN DOC REV: CPT | Performed by: OBSTETRICS & GYNECOLOGY

## 2023-11-16 PROCEDURE — 1123F ACP DISCUSS/DSCN MKR DOCD: CPT | Performed by: OBSTETRICS & GYNECOLOGY

## 2023-11-16 PROCEDURE — G8427 DOCREV CUR MEDS BY ELIG CLIN: HCPCS | Performed by: OBSTETRICS & GYNECOLOGY

## 2023-11-16 PROCEDURE — 1036F TOBACCO NON-USER: CPT | Performed by: OBSTETRICS & GYNECOLOGY

## 2023-11-16 PROCEDURE — G8417 CALC BMI ABV UP PARAM F/U: HCPCS | Performed by: OBSTETRICS & GYNECOLOGY

## 2023-11-16 PROCEDURE — G8484 FLU IMMUNIZE NO ADMIN: HCPCS | Performed by: OBSTETRICS & GYNECOLOGY

## 2023-11-16 NOTE — PROGRESS NOTES
Here today for repeat pap. Pap smear and cc urine obtained, labeled and sent to the lab. Discharge instructions have been discussed with the patient. Patient advised to call our office with any questions or concerns. Voiced understanding.

## 2023-11-16 NOTE — PROGRESS NOTES
HISTORY OF PRESENT ILLNESS:    80 y.o. female   presents for repeat pap due to unsatisfactory pap. Pt also  treated for UTI. US ordered for pelvic pain, pt did not do due to pain resolving after UTI treated. Past Medical History:   Past Medical History:   Diagnosis Date    Asthma     Atypical ductal hyperplasia of right breast     HLD (hyperlipidemia)     HPV (human papilloma virus) infection     Hypothyroidism     Osteopenia                                              OB History    Para Term  AB Living   2 2 2     2   SAB IAB Ectopic Molar Multiple Live Births             2      # Outcome Date GA Lbr Zeferino/2nd Weight Sex Delivery Anes PTL Lv   2 Term 84    F Vag-Spont   PATRICIO   1 Term 81    F Vag-Spont   PATRICIO         Past Surgical History:   Past Surgical History:   Procedure Laterality Date    APPENDECTOMY  around  [place] [address] [place]  2009    Sling    BREAST LUMPECTOMY Right 2019    RIGHT BREAST NEEDLE LOCALIZED LUMPECTOMY -- TRIDENT performed by Anup Darling MD at [address] [facility] LUMPECTOMY Right     benign    BUNIONECTOMY Right 2016    Cleaned joint right great toe along with bone spurs, Dr. Merly Epstein      CYST REMOVAL  2020    left eye     THYROIDECTOMY  around     Over and underactive, large, small remnant        Allergies: Pcn [penicillins]     Medications:   Current Outpatient Medications   Medication Sig Dispense Refill    SYNTHROID 50 MCG tablet Take 1 tablet by mouth Daily 30 tablet 5    loratadine (CLARITIN) 10 MG tablet Take 1 tablet by mouth daily 90 tablet 1    montelukast (SINGULAIR) 10 MG tablet Take 1 tablet by mouth nightly 90 tablet 1    ipratropium (ATROVENT) 0.06 % nasal spray 2 sprays by Each Nostril route 4 times daily 15 mL 3    Omega-3 Fatty Acids (FISH OIL) 1000 MG capsule Take by mouth daily      CALCIUM-VITAMIN D PO Take 1 tablet by mouth 2 times daily.  Calcium 600 mg  Vit D 1000mg      b complex

## 2023-11-17 LAB
HPV SAMPLE: NORMAL
HPV SOURCE: NORMAL
HPV, GENOTYPE 16: NORMAL
HPV, GENOTYPE 18: NORMAL
HPV, HIGH RISK OTHER: NORMAL
HPV, INTERPRETATION: NORMAL

## 2023-11-18 LAB
CULTURE: ABNORMAL
SPECIMEN DESCRIPTION: ABNORMAL

## 2023-11-20 LAB
CULTURE: NORMAL
SPECIMEN DESCRIPTION: NORMAL

## 2023-11-21 LAB
HPV SAMPLE: NORMAL
HPV SOURCE: NORMAL
HPV, GENOTYPE 16: NOT DETECTED
HPV, GENOTYPE 18: NOT DETECTED
HPV, HIGH RISK OTHER: NOT DETECTED
HPV, INTERPRETATION: NORMAL

## 2023-11-22 LAB — GYNECOLOGY CYTOLOGY REPORT: NORMAL

## 2023-12-11 ENCOUNTER — TELEPHONE (OUTPATIENT)
Dept: OBGYN | Age: 68
End: 2023-12-11

## 2023-12-11 ENCOUNTER — APPOINTMENT (OUTPATIENT)
Dept: GENERAL RADIOLOGY | Age: 68
End: 2023-12-11
Attending: OBSTETRICS & GYNECOLOGY
Payer: MEDICARE

## 2023-12-11 ENCOUNTER — HOSPITAL ENCOUNTER (OUTPATIENT)
Dept: GENERAL RADIOLOGY | Age: 68
Discharge: HOME | End: 2023-12-13
Attending: OBSTETRICS & GYNECOLOGY
Payer: MEDICARE

## 2023-12-11 VITALS — BODY MASS INDEX: 26.31 KG/M2 | WEIGHT: 134 LBS | HEIGHT: 60 IN

## 2023-12-11 DIAGNOSIS — Z12.31 SCREENING MAMMOGRAM, ENCOUNTER FOR: ICD-10-CM

## 2023-12-11 DIAGNOSIS — Z01.419 ENCOUNTER FOR GYNECOLOGICAL EXAMINATION: ICD-10-CM

## 2023-12-11 LAB
ALBUMIN SERPL-MCNC: NORMAL G/DL (ref 3.5–5.2)
ALP SERPL-CCNC: NORMAL U/L (ref 35–104)
ALT SERPL-CCNC: NORMAL U/L (ref 5–33)
AMMONIA PLAS-SCNC: NORMAL UMOL/L (ref 11–51)
ANION GAP SERPL CALCULATED.3IONS-SCNC: NORMAL MMOL/L (ref 9–17)
AST SERPL-CCNC: NORMAL U/L
BASOPHILS # BLD: NORMAL K/UL (ref 0–0.2)
BASOPHILS NFR BLD: NORMAL % (ref 0–2)
BILIRUB SERPL-MCNC: NORMAL MG/DL (ref 0.3–1.2)
BUN SERPL-MCNC: NORMAL MG/DL (ref 8–23)
CALCIUM SERPL-MCNC: NORMAL MG/DL (ref 8.6–10.4)
CHLORIDE SERPL-SCNC: NORMAL MMOL/L (ref 98–107)
CO2 SERPL-SCNC: NORMAL MMOL/L (ref 20–31)
CREAT SERPL-MCNC: NORMAL MG/DL (ref 0.5–0.9)
EOSINOPHIL # BLD: NORMAL K/UL (ref 0–0.4)
EOSINOPHILS RELATIVE PERCENT: NORMAL % (ref 1–4)
ERYTHROCYTE [DISTWIDTH] IN BLOOD BY AUTOMATED COUNT: NORMAL % (ref 11.8–14.4)
GFR SERPL CREATININE-BSD FRML MDRD: NORMAL ML/MIN/1.73M2
GLUCOSE SERPL-MCNC: NORMAL MG/DL (ref 70–99)
HCT VFR BLD AUTO: NORMAL % (ref 36.3–47.1)
HGB BLD-MCNC: NORMAL G/DL (ref 11.9–15.1)
IMM GRANULOCYTES # BLD AUTO: NORMAL K/UL (ref 0–0.3)
IMM GRANULOCYTES NFR BLD: NORMAL %
LACTATE BLDV-SCNC: NORMAL MMOL/L
LYMPHOCYTES NFR BLD: NORMAL K/UL (ref 1–4.8)
LYMPHOCYTES RELATIVE PERCENT: NORMAL % (ref 24–44)
MCH RBC QN AUTO: NORMAL PG (ref 25.2–33.5)
MCHC RBC AUTO-ENTMCNC: NORMAL G/DL (ref 28.4–34.8)
MCV RBC AUTO: NORMAL FL (ref 82.6–102.9)
MONOCYTES NFR BLD: NORMAL % (ref 1–7)
MONOCYTES NFR BLD: NORMAL K/UL (ref 0.1–0.8)
NEUTROPHILS NFR BLD: NORMAL % (ref 36–66)
NEUTS SEG NFR BLD: NORMAL K/UL (ref 1.8–7.7)
PLATELET # BLD AUTO: NORMAL K/UL (ref 138–453)
PMV BLD AUTO: NORMAL FL (ref 8.1–13.5)
POTASSIUM SERPL-SCNC: NORMAL MMOL/L (ref 3.7–5.3)
PROT SERPL-MCNC: NORMAL G/DL (ref 6.4–8.3)
RBC # BLD AUTO: NORMAL M/UL (ref 3.95–5.11)
SODIUM SERPL-SCNC: NORMAL MMOL/L (ref 135–144)
TROPONIN I SERPL HS-MCNC: NORMAL NG/L (ref 0–14)
WBC OTHER # BLD: NORMAL K/UL (ref 3.5–11.3)

## 2023-12-11 PROCEDURE — 77063 BREAST TOMOSYNTHESIS BI: CPT

## 2023-12-11 NOTE — TELEPHONE ENCOUNTER
Spoke with patient and she said she did not have any blood work today. Gave this to [employer] to f/u on.

## 2023-12-11 NOTE — TELEPHONE ENCOUNTER
Spoke with patient regarding labs. Patient states she did not have any blood work drawn today.  Patient also states she spoke with her insurance company and that they will cover the dexascan if the codes are changed and requested that a new updated order be place.  Also wants to know if she needs a repeat pap or if it can wait till next year.  Advised her will return call when we have answers.

## 2023-12-11 NOTE — TELEPHONE ENCOUNTER
----- Message from Gabby Chung,  sent at 12/11/2023 11:22 AM EST -----  Brandi Russo, I did not order these tests. Please have patient f/u with who ordered them or send her to ED. Her troponin level is elevated. Thank you.

## 2023-12-13 ENCOUNTER — TELEPHONE (OUTPATIENT)
Dept: OBGYN CLINIC | Age: 68
End: 2023-12-13

## 2023-12-13 DIAGNOSIS — E03.9 ACQUIRED HYPOTHYROIDISM: ICD-10-CM

## 2023-12-13 RX ORDER — LEVOTHYROXINE SODIUM 50 MCG
50 TABLET ORAL DAILY
Qty: 30 TABLET | Refills: 1 | Status: SHIPPED | OUTPATIENT
Start: 2023-12-13

## 2023-12-13 NOTE — TELEPHONE ENCOUNTER
notified----- Message from Nicole Long DO sent at 12/12/2023  7:51 AM EST -----  Regarding: RE: Pap Test  Contact: [phone]  If her Paps have always been normal she can wait till her exam next year  ----- Message -----  From: Aneta Shepherd LPN  Sent: 12/11/2023  10:28 AM EST  To: Nicole Long DO  Subject: FW: Pap Test                                       ----- Message -----  From: Brittany Erwin  Sent: 12/11/2023   9:04 AM EST  To: [facility] Clinical Staff  Subject: Pap Test                                         Dr. Long,   I had a pap test recently with one of you colleagues because you were not available.  The results said there were not enough cells so I had a second test with the same results. Should I be concerned or should   I hvae a third pap test or just wait for my next visit in Nov 2024.  Thanks,  Narcisa Erwin

## 2024-01-04 ENCOUNTER — HOSPITAL ENCOUNTER (OUTPATIENT)
Age: 69
Discharge: HOME | End: 2024-01-04
Payer: MEDICARE

## 2024-01-04 LAB — TSH SERPL DL<=0.05 MIU/L-ACNC: 3.49 UIU/ML (ref 0.27–4.2)

## 2024-01-04 PROCEDURE — 84443 ASSAY THYROID STIM HORMONE: CPT

## 2024-01-04 PROCEDURE — 36415 COLL VENOUS BLD VENIPUNCTURE: CPT

## 2024-01-04 ASSESSMENT — PATIENT HEALTH QUESTIONNAIRE - PHQ9
SUM OF ALL RESPONSES TO PHQ QUESTIONS 1-9: 0
2. FEELING DOWN, DEPRESSED OR HOPELESS: NOT AT ALL
2. FEELING DOWN, DEPRESSED OR HOPELESS: 0
SUM OF ALL RESPONSES TO PHQ9 QUESTIONS 1 & 2: 0
SUM OF ALL RESPONSES TO PHQ QUESTIONS 1-9: 0
1. LITTLE INTEREST OR PLEASURE IN DOING THINGS: 0
SUM OF ALL RESPONSES TO PHQ QUESTIONS 1-9: 0
1. LITTLE INTEREST OR PLEASURE IN DOING THINGS: NOT AT ALL
SUM OF ALL RESPONSES TO PHQ QUESTIONS 1-9: 0
SUM OF ALL RESPONSES TO PHQ9 QUESTIONS 1 & 2: 0

## 2024-01-04 NOTE — PROGRESS NOTES
Psychiatric/Behavioral:  Negative for dysphoric mood.           Objective   Physical Exam  Vitals and nursing note reviewed.   Constitutional:       Appearance: Normal appearance. She is well-developed. She is not ill-appearing or toxic-appearing.   HENT:      Head: Normocephalic and atraumatic.   Eyes:      General: No scleral icterus.        Right eye: No discharge.         Left eye: No discharge.   Neck:      Thyroid: No thyromegaly.      Vascular: No carotid bruit.   Cardiovascular:      Rate and Rhythm: Normal rate and regular rhythm.      Heart sounds: Normal heart sounds, S1 normal and S2 normal. No murmur heard.     No friction rub. No gallop.   Pulmonary:      Effort: Pulmonary effort is normal. No respiratory distress.      Breath sounds: Normal breath sounds and air entry. No decreased breath sounds, wheezing or rales.   Abdominal:      Palpations: Abdomen is soft.      Tenderness: There is no abdominal tenderness.   Musculoskeletal:      Cervical back: Neck supple.      Right lower leg: No edema.      Left lower leg: No edema.   Lymphadenopathy:      Cervical: No cervical adenopathy.   Skin:     General: Skin is warm and dry.   Neurological:      General: No focal deficit present.      Mental Status: She is alert and oriented to person, place, and time.      Gait: Gait normal.   Psychiatric:         Attention and Perception: Attention normal.         Mood and Affect: Mood and affect normal.         Speech: Speech normal.         Behavior: Behavior is cooperative.         Cognition and Memory: Cognition and memory normal.                  An electronic signature was used to authenticate this note.    --Deann Salomon MD

## 2024-01-05 ENCOUNTER — OFFICE VISIT (OUTPATIENT)
Dept: FAMILY MEDICINE CLINIC | Age: 69
End: 2024-01-05

## 2024-01-05 VITALS
HEIGHT: 60 IN | HEART RATE: 72 BPM | WEIGHT: 135 LBS | BODY MASS INDEX: 26.5 KG/M2 | SYSTOLIC BLOOD PRESSURE: 115 MMHG | DIASTOLIC BLOOD PRESSURE: 66 MMHG | OXYGEN SATURATION: 97 % | RESPIRATION RATE: 16 BRPM | TEMPERATURE: 97 F

## 2024-01-05 DIAGNOSIS — E03.9 ACQUIRED HYPOTHYROIDISM: ICD-10-CM

## 2024-01-05 DIAGNOSIS — Z00.00 VISIT FOR PREVENTIVE HEALTH EXAMINATION: Primary | ICD-10-CM

## 2024-01-05 RX ORDER — LEVOTHYROXINE SODIUM 50 MCG
TABLET ORAL
Qty: 12 TABLET | Refills: 3 | Status: SHIPPED | OUTPATIENT
Start: 2024-01-05

## 2024-01-05 RX ORDER — LEVOTHYROXINE SODIUM 75 MCG
TABLET ORAL
Qty: 30 TABLET | Refills: 3
Start: 2024-01-05

## 2024-01-05 ASSESSMENT — ENCOUNTER SYMPTOMS
COUGH: 0
CONSTIPATION: 0
DIARRHEA: 0
WHEEZING: 0
ABDOMINAL PAIN: 0
CHEST TIGHTNESS: 0
SHORTNESS OF BREATH: 0

## 2024-01-08 ENCOUNTER — PATIENT MESSAGE (OUTPATIENT)
Dept: FAMILY MEDICINE CLINIC | Age: 69
End: 2024-01-08

## 2024-01-08 DIAGNOSIS — E03.9 ACQUIRED HYPOTHYROIDISM: ICD-10-CM

## 2024-01-08 RX ORDER — LEVOTHYROXINE SODIUM 75 MCG
TABLET ORAL
Qty: 12 TABLET | Refills: 3 | Status: SHIPPED | OUTPATIENT
Start: 2024-01-08

## 2024-02-05 ENCOUNTER — TELEPHONE (OUTPATIENT)
Dept: GENERAL RADIOLOGY | Age: 69
End: 2024-02-05

## 2024-02-05 ENCOUNTER — CLINICAL DOCUMENTATION (OUTPATIENT)
Dept: GENERAL RADIOLOGY | Age: 69
End: 2024-02-05

## 2024-02-05 NOTE — TELEPHONE ENCOUNTER
Patient returned my call regarding breast MRI.  Patient does not meet the criteria for high risk breast MRI.  TC score is 12%.  High risk is over 20%.  Notified Dr Hernandez also.

## 2024-03-09 DIAGNOSIS — J30.81 ALLERGIC RHINITIS DUE TO ANIMAL HAIR AND DANDER: ICD-10-CM

## 2024-03-11 RX ORDER — MONTELUKAST SODIUM 10 MG/1
10 TABLET ORAL NIGHTLY
Qty: 90 TABLET | Refills: 1 | Status: SHIPPED | OUTPATIENT
Start: 2024-03-11

## 2024-03-20 DIAGNOSIS — E03.9 ACQUIRED HYPOTHYROIDISM: ICD-10-CM

## 2024-03-20 RX ORDER — LEVOTHYROXINE SODIUM 50 MCG
TABLET ORAL
Qty: 12 TABLET | Refills: 3 | Status: SHIPPED | OUTPATIENT
Start: 2024-03-20

## 2024-04-23 DIAGNOSIS — E03.9 ACQUIRED HYPOTHYROIDISM: ICD-10-CM

## 2024-04-23 RX ORDER — LEVOTHYROXINE SODIUM 75 MCG
TABLET ORAL
Qty: 12 TABLET | Refills: 3 | Status: SHIPPED | OUTPATIENT
Start: 2024-04-23

## 2024-06-06 DIAGNOSIS — E03.9 ACQUIRED HYPOTHYROIDISM: ICD-10-CM

## 2024-06-06 RX ORDER — LEVOTHYROXINE SODIUM 50 MCG
TABLET ORAL
Qty: 12 TABLET | Refills: 3 | Status: SHIPPED | OUTPATIENT
Start: 2024-06-06

## 2024-08-12 DIAGNOSIS — E03.9 ACQUIRED HYPOTHYROIDISM: ICD-10-CM

## 2024-08-12 RX ORDER — LEVOTHYROXINE SODIUM 75 MCG
TABLET ORAL
Qty: 12 TABLET | Refills: 3 | Status: SHIPPED | OUTPATIENT
Start: 2024-08-12

## 2024-11-03 DIAGNOSIS — E03.9 ACQUIRED HYPOTHYROIDISM: ICD-10-CM

## 2024-11-04 RX ORDER — LEVOTHYROXINE SODIUM 50 MCG
TABLET ORAL
Qty: 16 TABLET | Refills: 2 | Status: SHIPPED | OUTPATIENT
Start: 2024-11-04

## 2024-11-04 RX ORDER — LEVOTHYROXINE SODIUM 50 MCG
TABLET ORAL
Qty: 12 TABLET | Refills: 2 | Status: SHIPPED
Start: 2024-11-04 | End: 2024-11-04 | Stop reason: DRUGHIGH

## 2024-11-04 NOTE — TELEPHONE ENCOUNTER
Name of Medication(s) Requested:  Requested Prescriptions     Pending Prescriptions Disp Refills    SYNTHROID 50 MCG tablet 12 tablet 2     Sig: take 1 tablet by mouth ON TUESDAY THURSDAY SATURDAY AND SUNDAY       Medication is on current medication list Yes    Dosage and directions were verified? Yes    Quantity verified: 30 day supply     Pharmacy Verified?  Yes    Last Appointment:  1/5/2024    Future appts:  Future Appointments   Date Time Provider Department Center   11[facility]5/2024 10:40 AM [facility]   1/14/2025  1:00 PM Deann Salomon MD [facility] DE[facility]        (If no appt send self scheduling link. .REFILLAPPT)  Scheduling request sent?     [] Yes  [x] No    Does patient need updated?  [] Yes  [x] No

## 2024-11-05 ENCOUNTER — OFFICE VISIT (OUTPATIENT)
Dept: OBGYN | Age: 69
End: 2024-11-05
Payer: MEDICARE

## 2024-11-05 VITALS
BODY MASS INDEX: 25.58 KG/M2 | HEART RATE: 70 BPM | WEIGHT: 131 LBS | SYSTOLIC BLOOD PRESSURE: 123 MMHG | DIASTOLIC BLOOD PRESSURE: 75 MMHG

## 2024-11-05 DIAGNOSIS — M85.88 OSTEOPENIA OF LUMBAR SPINE: ICD-10-CM

## 2024-11-05 DIAGNOSIS — Z12.31 ENCOUNTER FOR SCREENING MAMMOGRAM FOR MALIGNANT NEOPLASM OF BREAST: Primary | ICD-10-CM

## 2024-11-05 DIAGNOSIS — Z01.419 ENCOUNTER FOR ROUTINE GYNECOLOGIC EXAMINATION IN MEDICARE PATIENT: ICD-10-CM

## 2024-11-05 DIAGNOSIS — Z12.11 SPECIAL SCREENING FOR MALIGNANT NEOPLASM OF COLON: ICD-10-CM

## 2024-11-05 PROCEDURE — 99213 OFFICE O/P EST LOW 20 MIN: CPT | Performed by: OBSTETRICS & GYNECOLOGY

## 2024-11-05 SDOH — ECONOMIC STABILITY: FOOD INSECURITY: WITHIN THE PAST 12 MONTHS, YOU WORRIED THAT YOUR FOOD WOULD RUN OUT BEFORE YOU GOT MONEY TO BUY MORE.: NEVER TRUE

## 2024-11-05 SDOH — ECONOMIC STABILITY: INCOME INSECURITY: HOW HARD IS IT FOR YOU TO PAY FOR THE VERY BASICS LIKE FOOD, HOUSING, MEDICAL CARE, AND HEATING?: NOT HARD AT ALL

## 2024-11-05 SDOH — ECONOMIC STABILITY: FOOD INSECURITY: WITHIN THE PAST 12 MONTHS, THE FOOD YOU BOUGHT JUST DIDN'T LAST AND YOU DIDN'T HAVE MONEY TO GET MORE.: NEVER TRUE

## 2024-11-05 ASSESSMENT — ENCOUNTER SYMPTOMS
CONSTIPATION: 0
COUGH: 0
ABDOMINAL PAIN: 0
WHEEZING: 0
BLOOD IN STOOL: 0
VOMITING: 0
NAUSEA: 0
DIARRHEA: 0
SHORTNESS OF BREATH: 0

## 2024-11-05 NOTE — PROGRESS NOTES
Brittany Erwin is a 69-year-old G2, P2 female whose LMP was 52 with no PMB.  Uncomplicated pregnancies.  Denies any change in PMH, PSH FH or allergy history.  She and her partner are not sexually active he has multiple health concerns.  Paternal grandmother breast CA and Narcisa had ductal carcinoma in situ.  She denies anxiety, depression, self-harm or suicidal ideation.  Reports no past or present history of physical sexual or verbal abuse.  Date of last Cervical Cancer screen (HPV or PAP): 11/16/2023 neg  2021 colpo mild atypia positive high risk HPV  Date of last Mammogram: 12/11/2023 normal  Insurance denies her MRIs.  Evens Breast Cancer Risk: Evens: 12.58%  DEXA Result (most recent):  DEXA BONE DENSITY AXIAL SKELETON 11/11/2021    Narrative  EXAMINATION:  BONE DENSITOMETRY    11/11/2021 10:12 am    TECHNIQUE:  A bone density DEXA scan was performed of the lumbar spine and bilateral hips  on a [employer] system.    COMPARISON:  None.    HISTORY:  ORDERING SYSTEM PROVIDED HISTORY: Screening for osteoporosis  TECHNOLOGIST PROVIDED HISTORY:  Reason for exam:->Screening for osteoporosis    FINDINGS:  LUMBAR SPINE:    The bone mineral density in the lumbar spine including the L1-L4 levels is  measured at 0.974 g/cm2, which corresponds to a T-score of -1.7 and a Z-score  of 0.1.  This is within the osteopenia range by WHO criteria.    LEFT HIP:    The bone mineral density in the total hip is measured at 0.926 g/cm2  corresponding to a T-score of -0.6 and a Z-score of 0.7.  This is within the  normal range by WHO criteria.    The bone mineral density of the femoral neck is measured at 0.760 g/cm2  corresponding to a T-score of -2.0 and a Z-score of -0.4.  This is within the  osteopenia range by WHO criteria.    RIGHT HIP:    The bone mineral density in the total hip is measured at 0.863 g/cm2  corresponding to a T-score of -1.1 and a Z-score of 0.2.  This is within the  osteopenia range by WHO

## 2024-11-05 NOTE — PROGRESS NOTES
Here today for her annual exam  Last pap 11/16/2023 unsatisfactory, HPV-  Patient has no complaints today  Discharge instructions have been discussed with the patient. Patient advised to call our office with any questions or concerns.   Voiced understanding.  Pap smear obtained, labeled and sent to lab.

## 2024-11-11 LAB
GYNECOLOGY CYTOLOGY REPORT: NORMAL
HPV SAMPLE: ABNORMAL
HPV SOURCE: ABNORMAL
HPV, GENOTYPE 16: NOT DETECTED
HPV, GENOTYPE 18: NOT DETECTED
HPV, HIGH RISK OTHER: DETECTED
HPV, INTERPRETATION: ABNORMAL

## 2024-11-14 ENCOUNTER — TELEPHONE (OUTPATIENT)
Dept: OBGYN | Age: 69
End: 2024-11-14

## 2024-11-14 NOTE — TELEPHONE ENCOUNTER
----- Message from Dr. Nicole Long, DO sent at 11/12/2024 12:56 PM EST -----  Pap returned positive HPV because of that we need to proceed with a colposcopy directed biopsy I will explain to her the procedure before we perform it.  Need to take a closer look at her cervix to make sure that the HPV virus has not changed any of the cells of the cervix

## 2024-12-01 DIAGNOSIS — E03.9 ACQUIRED HYPOTHYROIDISM: ICD-10-CM

## 2024-12-02 ENCOUNTER — E-VISIT (OUTPATIENT)
Dept: FAMILY MEDICINE CLINIC | Age: 69
End: 2024-12-02
Payer: MEDICARE

## 2024-12-02 DIAGNOSIS — B96.89 ACUTE BACTERIAL SINUSITIS: Primary | ICD-10-CM

## 2024-12-02 DIAGNOSIS — J01.90 ACUTE BACTERIAL SINUSITIS: Primary | ICD-10-CM

## 2024-12-02 PROCEDURE — 99421 OL DIG E/M SVC 5-10 MIN: CPT | Performed by: FAMILY MEDICINE

## 2024-12-02 RX ORDER — DOXYCYCLINE HYCLATE 100 MG
100 TABLET ORAL 2 TIMES DAILY
Qty: 10 TABLET | Refills: 0 | Status: SHIPPED | OUTPATIENT
Start: 2024-12-02 | End: 2024-12-07

## 2024-12-02 RX ORDER — LEVOTHYROXINE SODIUM 75 MCG
TABLET ORAL
Qty: 12 TABLET | Refills: 3 | Status: SHIPPED | OUTPATIENT
Start: 2024-12-02

## 2024-12-02 ASSESSMENT — LIFESTYLE VARIABLES: SMOKING_STATUS: NO, I'VE NEVER SMOKED

## 2024-12-02 NOTE — TELEPHONE ENCOUNTER
Name of Medication(s) Requested:  Requested Prescriptions     Pending Prescriptions Disp Refills    SYNTHROID 75 MCG tablet 12 tablet 3     Sig: take 1 tablet every Monday, Wednesday, and Friday       Medication is on current medication list Yes    Dosage and directions were verified? Yes    Quantity verified: 90 day supply     Pharmacy Verified?  Yes    Last Appointment:  1/5/2024    Future appts:  Future Appointments   Date Time Provider Department Center   1[facility]23[facility]2025  8:[facility]   2/18/2025  1:45 PM [facility]   11/6/2025 10:40 AM [facility]        (If no appt send self scheduling link. .REFILLAPPT)  Scheduling request sent?     [] Yes  [x] No    Does patient need updated?  [] Yes  [x] No

## 2024-12-02 NOTE — PROGRESS NOTES
HPI: as per patient provided history  Exam: N/A (electronic visit)  ASSESSMENT/PLAN:  Diagnoses and all orders for this visit:    Acute bacterial sinusitis  -     doxycycline hyclate (VIBRA-TABS) 100 MG tablet; Take 1 tablet by mouth 2 times daily for 5 days    Narcisa  I sent in some doxycycline (an antibiotic) that you can take for 5 days.  Lets see if it helps.  Please also buy some mucinex as this should help break up the mucous.  Also get some sudafed for daytime so we can open up your passages.    If you can stop your claritin during this time and just use flonase for allergies during this time, that would help.  Let me know if you don't get better.  Dr. Salomon    Patient instructed to call the office if worsens, or fails to improve as anticipated.    5-10 minutes were spent on the digital evaluation and management of this patient.

## 2025-01-13 ENCOUNTER — E-VISIT (OUTPATIENT)
Dept: FAMILY MEDICINE CLINIC | Age: 70
End: 2025-01-13
Payer: MEDICARE

## 2025-01-13 DIAGNOSIS — N39.0 URINARY TRACT INFECTION WITHOUT HEMATURIA, SITE UNSPECIFIED: Primary | ICD-10-CM

## 2025-01-13 PROCEDURE — 99421 OL DIG E/M SVC 5-10 MIN: CPT | Performed by: FAMILY MEDICINE

## 2025-01-13 RX ORDER — NITROFURANTOIN 25; 75 MG/1; MG/1
100 CAPSULE ORAL 2 TIMES DAILY
Qty: 10 CAPSULE | Refills: 0 | Status: SHIPPED | OUTPATIENT
Start: 2025-01-13 | End: 2025-01-18

## 2025-01-13 NOTE — PROGRESS NOTES
Brittany Erwin (1955) initiated an asynchronous digital communication through [employer].    HPI: per patient questionnaire     Exam: not applicable    Diagnoses and all orders for this visit:    Urinary tract infection without hematuria, site unspecified  -     nitrofurantoin, macrocrystal-monohydrate, (MACROBID) 100 MG capsule; Take 1 capsule by mouth 2 times daily for 5 days  -     Culture, Urine; Future      Pt instructed to call office if she has fever, back pain or does not get better.      Time: EV1 - 5-10 minutes were spent on the digital evaluation and management of this patient.    Deann Salomon MD

## 2025-01-23 ENCOUNTER — PROCEDURE VISIT (OUTPATIENT)
Dept: OBGYN | Age: 70
End: 2025-01-23

## 2025-01-23 VITALS
TEMPERATURE: 98.6 F | WEIGHT: 128.4 LBS | HEIGHT: 61 IN | HEART RATE: 70 BPM | RESPIRATION RATE: 18 BRPM | SYSTOLIC BLOOD PRESSURE: 115 MMHG | OXYGEN SATURATION: 96 % | BODY MASS INDEX: 24.24 KG/M2 | DIASTOLIC BLOOD PRESSURE: 67 MMHG

## 2025-01-23 DIAGNOSIS — B97.7 HIGH RISK HPV INFECTION: Primary | ICD-10-CM

## 2025-01-23 LAB
CONTROL: NORMAL
PREGNANCY TEST URINE, POC: NORMAL

## 2025-01-23 SDOH — ECONOMIC STABILITY: FOOD INSECURITY: WITHIN THE PAST 12 MONTHS, THE FOOD YOU BOUGHT JUST DIDN'T LAST AND YOU DIDN'T HAVE MONEY TO GET MORE.: NEVER TRUE

## 2025-01-23 SDOH — ECONOMIC STABILITY: FOOD INSECURITY: WITHIN THE PAST 12 MONTHS, YOU WORRIED THAT YOUR FOOD WOULD RUN OUT BEFORE YOU GOT MONEY TO BUY MORE.: NEVER TRUE

## 2025-01-23 ASSESSMENT — PATIENT HEALTH QUESTIONNAIRE - PHQ9
2. FEELING DOWN, DEPRESSED OR HOPELESS: NOT AT ALL
SUM OF ALL RESPONSES TO PHQ QUESTIONS 1-9: 0
SUM OF ALL RESPONSES TO PHQ9 QUESTIONS 1 & 2: 0
SUM OF ALL RESPONSES TO PHQ QUESTIONS 1-9: 0
1. LITTLE INTEREST OR PLEASURE IN DOING THINGS: NOT AT ALL

## 2025-01-23 NOTE — PROGRESS NOTES
Preop high risk HPV other             Normal Pap    Postop same    Surgeon Mercedes BEDOYA    Informed consent and timeout performed    Procedure colposcopy of the cervix upper and adjacent vagina with biopsies    Procedure the vagina and cervix were cleansed with sterile water.     A 3 to 5% acetic acid solution was applied to the cervix     Squamocolumnar junction nulliparous.      Acetowhite no    Punctations 11    Mosaics no  Hemostasis achieved with silver nitrate      Tolerated procedure well  Vaginal rest x72 hours  Report bleeding soaking through 2 pads in an hour  Fever 100.4 or above  We will call with pathology report

## 2025-01-23 NOTE — PROGRESS NOTES
Pt presents for Colposcopy.  Urine pregnancy test negative.  Consent obtained and signed by pt, provider and this MA.   Medications and allergies reviewed with pt.   Procedural timeout preformed prior to start of procedure.    No concerns or complaints  Discharge instructions have been discussed with the patient. Patient advised to call our office with any questions or concerns. Voiced understanding.

## 2025-01-27 DIAGNOSIS — E03.9 ACQUIRED HYPOTHYROIDISM: ICD-10-CM

## 2025-01-27 RX ORDER — LEVOTHYROXINE SODIUM 50 MCG
TABLET ORAL
Qty: 16 TABLET | Refills: 2 | Status: SHIPPED | OUTPATIENT
Start: 2025-01-27

## 2025-01-27 NOTE — TELEPHONE ENCOUNTER
Name of Medication(s) Requested:  Requested Prescriptions     Pending Prescriptions Disp Refills    SYNTHROID 50 MCG tablet 16 tablet 2     Sig: take 1 tablet by mouth ON TUESDAY THURSDAY SATURDAY AND SUNDAY       Medication is on current medication list Yes    Dosage and directions were verified? Yes    Quantity verified: 30 day supply     Pharmacy Verified?  Yes    Last Appointment:  1/13/2025    Future appts:  Future Appointments   Date Time Provider Department Center   2/18/2025  8:30 AM SE[facility] ABDU DINAH RM 2 [facility]   2/18/2025  8:45 AM SE[facility] ABDU DEXA [facility]   2/18/2025  1:45 PM Deann Salomon MD [facility] DEP   11/6/2025 10:40 AM Nicole Long DO [facility]        (If no appt send self scheduling link. .REFILLAPPT)  Scheduling request sent?     [] Yes  [x] No    Does patient need updated?  [] Yes  [x] No

## 2025-01-29 LAB — SURGICAL PATHOLOGY REPORT: NORMAL

## 2025-01-30 ENCOUNTER — E-VISIT (OUTPATIENT)
Dept: FAMILY MEDICINE CLINIC | Age: 70
End: 2025-01-30
Payer: MEDICARE

## 2025-01-30 DIAGNOSIS — J06.9 VIRAL UPPER RESPIRATORY ILLNESS: Primary | ICD-10-CM

## 2025-01-30 PROCEDURE — 99421 OL DIG E/M SVC 5-10 MIN: CPT | Performed by: FAMILY MEDICINE

## 2025-01-30 ASSESSMENT — LIFESTYLE VARIABLES: SMOKING_STATUS: NO, I'VE NEVER SMOKED

## 2025-01-30 NOTE — PROGRESS NOTES
Brittany EMORY Erwin (1955) initiated an asynchronous digital communication through [employer].    HPI: per patient questionnaire     Exam: not applicable, evisit    Diagnoses and all orders for this visit:    Viral upper respiratory illness    This is likely a viral illness due to the loss of voice, clear mucous, runny nose and cough.  The sinus pressure is likely from the full sinuses.  Saline washes can help with this along with flonase and PLAIN mucinex which will help break up the mucous and decrease the inflammation in the sinuses.  These are available over the counter.      If you are not better in 10 days or so.  Please let us know.  You may get a bit worse over the next few days but then should steadily get slowly better.  The cough can last a few weeks.      Time: EV1 - 5-10 minutes were spent on the digital evaluation and management of this patient.    Deann Salomon MD

## 2025-01-31 RX ORDER — FLUTICASONE PROPIONATE 50 MCG
2 SPRAY, SUSPENSION (ML) NASAL DAILY
Qty: 48 G | Refills: 0 | Status: SHIPPED | OUTPATIENT
Start: 2025-01-31

## 2025-01-31 RX ORDER — GUAIFENESIN 600 MG/1
600 TABLET, EXTENDED RELEASE ORAL 2 TIMES DAILY
Qty: 30 TABLET | Refills: 0 | Status: SHIPPED | OUTPATIENT
Start: 2025-01-31 | End: 2025-02-15

## 2025-02-05 ENCOUNTER — TELEPHONE (OUTPATIENT)
Dept: OBGYN | Age: 70
End: 2025-02-05

## 2025-02-15 SDOH — HEALTH STABILITY: PHYSICAL HEALTH: ON AVERAGE, HOW MANY MINUTES DO YOU ENGAGE IN EXERCISE AT THIS LEVEL?: 20 MIN

## 2025-02-15 SDOH — HEALTH STABILITY: PHYSICAL HEALTH: ON AVERAGE, HOW MANY DAYS PER WEEK DO YOU ENGAGE IN MODERATE TO STRENUOUS EXERCISE (LIKE A BRISK WALK)?: 3 DAYS

## 2025-02-15 ASSESSMENT — LIFESTYLE VARIABLES
HOW MANY STANDARD DRINKS CONTAINING ALCOHOL DO YOU HAVE ON A TYPICAL DAY: PATIENT DOES NOT DRINK
HOW OFTEN DO YOU HAVE A DRINK CONTAINING ALCOHOL: 1
HOW MANY STANDARD DRINKS CONTAINING ALCOHOL DO YOU HAVE ON A TYPICAL DAY: 0
HOW OFTEN DO YOU HAVE SIX OR MORE DRINKS ON ONE OCCASION: 1
HOW OFTEN DO YOU HAVE A DRINK CONTAINING ALCOHOL: NEVER

## 2025-02-15 ASSESSMENT — PATIENT HEALTH QUESTIONNAIRE - PHQ9
SUM OF ALL RESPONSES TO PHQ9 QUESTIONS 1 & 2: 0
1. LITTLE INTEREST OR PLEASURE IN DOING THINGS: NOT AT ALL
SUM OF ALL RESPONSES TO PHQ QUESTIONS 1-9: 0
2. FEELING DOWN, DEPRESSED OR HOPELESS: NOT AT ALL
SUM OF ALL RESPONSES TO PHQ QUESTIONS 1-9: 0

## 2025-02-18 ENCOUNTER — HOSPITAL ENCOUNTER (OUTPATIENT)
Dept: GENERAL RADIOLOGY | Age: 70
Discharge: HOME | End: 2025-02-20
Attending: OBSTETRICS & GYNECOLOGY
Payer: MEDICARE

## 2025-02-18 ENCOUNTER — HOSPITAL ENCOUNTER (OUTPATIENT)
Age: 70
Discharge: HOME | End: 2025-02-18
Payer: MEDICARE

## 2025-02-18 ENCOUNTER — TELEPHONE (OUTPATIENT)
Dept: FAMILY MEDICINE CLINIC | Age: 70
End: 2025-02-18

## 2025-02-18 ENCOUNTER — OFFICE VISIT (OUTPATIENT)
Dept: FAMILY MEDICINE CLINIC | Age: 70
End: 2025-02-18

## 2025-02-18 VITALS
HEART RATE: 71 BPM | WEIGHT: 129 LBS | DIASTOLIC BLOOD PRESSURE: 80 MMHG | BODY MASS INDEX: 25.32 KG/M2 | TEMPERATURE: 98.1 F | SYSTOLIC BLOOD PRESSURE: 126 MMHG | RESPIRATION RATE: 17 BRPM | HEIGHT: 60 IN | OXYGEN SATURATION: 99 %

## 2025-02-18 DIAGNOSIS — Z12.31 ENCOUNTER FOR SCREENING MAMMOGRAM FOR MALIGNANT NEOPLASM OF BREAST: ICD-10-CM

## 2025-02-18 DIAGNOSIS — J30.81 ALLERGIC RHINITIS DUE TO ANIMAL HAIR AND DANDER: ICD-10-CM

## 2025-02-18 DIAGNOSIS — E03.9 ACQUIRED HYPOTHYROIDISM: ICD-10-CM

## 2025-02-18 DIAGNOSIS — Z00.00 ENCOUNTER FOR PREVENTIVE CARE: ICD-10-CM

## 2025-02-18 DIAGNOSIS — Z00.00 MEDICARE ANNUAL WELLNESS VISIT, SUBSEQUENT: ICD-10-CM

## 2025-02-18 DIAGNOSIS — M85.88 OSTEOPENIA OF LUMBAR SPINE: ICD-10-CM

## 2025-02-18 DIAGNOSIS — E83.51 LOW CALCIUM LEVELS: ICD-10-CM

## 2025-02-18 LAB
ALBUMIN SERPL-MCNC: 4 G/DL (ref 3.5–5.2)
ALP SERPL-CCNC: 88 U/L (ref 35–104)
ALT SERPL-CCNC: 22 U/L (ref 0–32)
ANION GAP SERPL CALCULATED.3IONS-SCNC: 11 MMOL/L (ref 7–16)
AST SERPL-CCNC: 29 U/L (ref 0–31)
BILIRUB SERPL-MCNC: 0.4 MG/DL (ref 0–1.2)
BUN SERPL-MCNC: 15 MG/DL (ref 6–23)
CALCIUM SERPL-MCNC: 7.8 MG/DL (ref 8.6–10.2)
CHLORIDE SERPL-SCNC: 107 MMOL/L (ref 98–107)
CHOLEST SERPL-MCNC: 205 MG/DL
CO2 SERPL-SCNC: 26 MMOL/L (ref 22–29)
CREAT SERPL-MCNC: 0.9 MG/DL (ref 0.5–1)
GFR, ESTIMATED: 68 ML/MIN/1.73M2
GLUCOSE SERPL-MCNC: 99 MG/DL (ref 74–99)
HDLC SERPL-MCNC: 75 MG/DL
LDLC SERPL CALC-MCNC: 114 MG/DL
POTASSIUM SERPL-SCNC: 4.3 MMOL/L (ref 3.5–5)
PROT SERPL-MCNC: 6.6 G/DL (ref 6.4–8.3)
SODIUM SERPL-SCNC: 144 MMOL/L (ref 132–146)
TRIGL SERPL-MCNC: 78 MG/DL
TSH SERPL DL<=0.05 MIU/L-ACNC: 1.3 UIU/ML (ref 0.27–4.2)
VLDLC SERPL CALC-MCNC: 16 MG/DL

## 2025-02-18 PROCEDURE — 80053 COMPREHEN METABOLIC PANEL: CPT

## 2025-02-18 PROCEDURE — 84443 ASSAY THYROID STIM HORMONE: CPT

## 2025-02-18 PROCEDURE — 36415 COLL VENOUS BLD VENIPUNCTURE: CPT

## 2025-02-18 PROCEDURE — 77080 DXA BONE DENSITY AXIAL: CPT

## 2025-02-18 PROCEDURE — 77067 SCR MAMMO BI INCL CAD: CPT

## 2025-02-18 PROCEDURE — 80061 LIPID PANEL: CPT

## 2025-02-18 RX ORDER — LEVOTHYROXINE SODIUM 50 MCG
TABLET ORAL
Qty: 16 TABLET | Refills: 2 | Status: SHIPPED | OUTPATIENT
Start: 2025-02-18

## 2025-02-18 RX ORDER — LORATADINE 10 MG/1
10 TABLET ORAL DAILY
Qty: 90 TABLET | Refills: 3 | Status: SHIPPED | OUTPATIENT
Start: 2025-02-18

## 2025-02-18 RX ORDER — MONTELUKAST SODIUM 10 MG/1
10 TABLET ORAL NIGHTLY
Qty: 90 TABLET | Refills: 3 | Status: SHIPPED | OUTPATIENT
Start: 2025-02-18

## 2025-02-18 RX ORDER — LEVOTHYROXINE SODIUM 75 MCG
TABLET ORAL
Qty: 12 TABLET | Refills: 3 | Status: SHIPPED | OUTPATIENT
Start: 2025-02-18

## 2025-02-18 NOTE — PATIENT INSTRUCTIONS
Look to see if niacin is in the energy.      Call about cologuard     Advance Directives: Care Instructions  Overview  An advance directive is a legal way to state your wishes at the end of your life. It tells your family and your doctor what to do if you can't say what you want.  There are two main types of advance directives. You can change them any time your wishes change.  Living will.  This form tells your family and your doctor your wishes about life support and other treatment. The form is also called a declaration.  Medical power of .  This form lets you name a person to make treatment decisions for you when you can't speak for yourself. This person is called a health care agent (health care proxy, health care surrogate). The form is also called a durable power of  for health care.  If you do not have an advance directive, decisions about your medical care may be made by a family member, or by a doctor or a  who doesn't know you.  It may help to think of an advance directive as a gift to the people who care for you. If you have one, they won't have to make tough decisions by themselves.  For more information, including forms for your state, see the [employer] website (www.caringinfo.org/planning/advance-directives/).  Follow-up care is a key part of your treatment and safety. Be sure to make and go to all appointments, and call your doctor if you are having problems. It's also a good idea to know your test results and keep a list of the medicines you take.  What should you include in an advance directive?  Many states have a unique advance directive form. (It may ask you to address specific issues.) Or you might use a universal form that's approved by many states.  If your form doesn't tell you what to address, it may be hard to know what to include in your advance directive. Use the questions below to help you get started.  Who do you want to make decisions about your medical care if

## 2025-02-18 NOTE — PROGRESS NOTES
Pt here for AWV.    Needs cologuard - does she have at home?  (Ordered Nov 2024)    Hypothyroidism - needs TSH.  We changed her dose and she never had the lab rechecked.  She is on 50 t/t/s/s, 75 m/w/f.   Was previously on 50 daily.  350 to 425 up by 21%.    TSH is normal today.    Allergies - on singular, claritin, flonase.  Linger sniffe and cough.    Not taking the flonase.  Is taking the singular, loratidine.    Seeing GYN - had colpo showing atrophy and negative HPV  She gets hot flashes a few times a week    Did not have DEXA repeated - doing today.    Mammo today as well.     The 10-year ASCVD risk score (Amy MASON, et al., 2019) is: 7.8%    Values used to calculate the score:      Age: 70 years      Sex: Female      Is Non- : No      Diabetic: No      Tobacco smoker: No      Systolic Blood Pressure: 126 mmHg      Is BP treated: No      HDL Cholesterol: 75 mg/dL      Total Cholesterol: 205 mg/dL      Medicare Annual Wellness Visit    Brittany Erwin is here for Medicare AWV    Assessment & Plan   Medicare annual wellness visit, subsequent  See note below.  Preventive care reviewed.  Pt to call insurance company about colon cancer screening.  Pt to have DEXA and mammo today.  Following closely with GYN.  Low calcium levels  -     Magnesium  -     Phosphorus  -     Vitamin D 25 Hydroxy  -     PTH, Intact  Lab reviewed.  Labs above ordered.   Allergic rhinitis due to animal hair and dander  -     loratadine (CLARITIN) 10 MG tablet; Take 1 tablet by mouth daily, Disp-90 tablet, R-3Normal  -     montelukast (SINGULAIR) 10 MG tablet; Take 1 tablet by mouth nightly, Disp-90 tablet, R-3Normal  Acquired hypothyroidism  -     SYNTHROID 75 MCG tablet; take 1 tablet every Monday, Wednesday, and Friday, Disp-12 tablet, R-3, DAWNormal  -     SYNTHROID 50 MCG tablet; take 1 tablet by mouth ON TUESDAY THURSDAY SATURDAY AND SUNDAY, Disp-16 tablet, R-2, DAWNormal  TSH reviewed today.  WNL.  Encounter

## 2025-02-19 LAB
HEPATITIS C ANTIBODY: NONREACTIVE
MAGNESIUM: 1.9 MG/DL (ref 1.6–2.6)
PHOSPHORUS: 2.9 MG/DL (ref 2.5–4.5)
PTH INTACT: 28.7 PG/ML (ref 15–65)
VITAMIN D 25-HYDROXY: 30.9 NG/ML (ref 30–100)

## 2025-02-20 NOTE — RESULT ENCOUNTER NOTE
Please call patient.  Everything is normal in her labs.  She just needs to get in more calcium.  Please either make sure to get calcium in her diet (yogurt, milk, cottage cheese) DAILY to a minimum of 1000 mg.   She can look up \"National Institutes of Health, Calcium\" and they have a good webpage on this that has a link to the [employer] data bank which has most foods that you can look up their calcium amounts depending on how much you are eating.  OR, she can take a calcium supplement depending on how much calcium she is getting in her diet (usually 500-1000mg a day).

## 2025-02-27 ENCOUNTER — TELEPHONE (OUTPATIENT)
Dept: FAMILY MEDICINE CLINIC | Age: 70
End: 2025-02-27

## 2025-02-27 NOTE — TELEPHONE ENCOUNTER
Called and talked with patient.  She had a DEXA done in 2021 and has improved by 2 and 5% since that time.  She lifts 40-50 lbs boxes at work and is willing to add heavy weight training to her routine.  She currently has slightly low calcium with all other normal labs that we are going to recheck in 6 mn so she has started vit D 2000 IU with calcium.   PDF with exercises sent from \"[employer].\"

## 2025-05-14 LAB — NONINV COLON CA DNA+OCC BLD SCRN STL QL: NEGATIVE

## 2025-05-15 ENCOUNTER — RESULTS FOLLOW-UP (OUTPATIENT)
Age: 70
End: 2025-05-15

## 2025-05-15 NOTE — TELEPHONE ENCOUNTER
----- Message from Dr. Nicole Long DO sent at 5/15/2025  7:42 AM EDT -----  Please let her know her Cologuard was negative

## 2025-07-13 DIAGNOSIS — E03.9 ACQUIRED HYPOTHYROIDISM: ICD-10-CM

## 2025-07-14 RX ORDER — LEVOTHYROXINE SODIUM 50 MCG
TABLET ORAL
Qty: 16 TABLET | Refills: 5 | Status: SHIPPED | OUTPATIENT
Start: 2025-07-14

## 2025-07-14 RX ORDER — LEVOTHYROXINE SODIUM 75 MCG
TABLET ORAL
Qty: 12 TABLET | Refills: 5 | Status: SHIPPED | OUTPATIENT
Start: 2025-07-14

## 2025-07-14 NOTE — TELEPHONE ENCOUNTER
Name of Medication(s) Requested:  Requested Prescriptions     Pending Prescriptions Disp Refills    SYNTHROID 75 MCG tablet [Pharmacy Med Name: SYNTHROID 75MCG TABLET] 12 tablet 5     Sig: TAKE ONE TABLET BY MOUTH EVERY MONDAY, WEDNESDAY, AND FRIDAY    SYNTHROID 50 MCG tablet [Pharmacy Med Name: SYNTHROID 50 MCG TABLET] 16 tablet 5     Sig: TAKE ONE TABLET BY MOUTH ON TUESDAY THURSDAY SATURDAY AND SUNDAY       Medication is on current medication list Yes    Dosage and directions were verified? Yes    Quantity verified: 90 day supply     Pharmacy Verified?  Yes    Last Appointment:  2/18/2025    Future appts:  Future Appointments   Date Time Provider Department Center   8/19/2025  1:30 PM Deann Salomon MD [facility]   11/6/2025 10:40 AM Nicole Long, DO [facility]        Scheduling request sent?     [] Yes  [x] No    Does patient need updated?  [] Yes  [x] No

## 2025-07-28 ENCOUNTER — TELEPHONE (OUTPATIENT)
Age: 70
End: 2025-07-28

## 2025-07-28 NOTE — TELEPHONE ENCOUNTER
Called and left patient a voicemail that she will need to reschedule her appointment since doctor will not be available that day. She is in surgery.

## 2025-08-19 ENCOUNTER — OFFICE VISIT (OUTPATIENT)
Dept: FAMILY MEDICINE CLINIC | Age: 70
End: 2025-08-19

## 2025-08-19 VITALS
WEIGHT: 128 LBS | HEART RATE: 74 BPM | TEMPERATURE: 98.2 F | OXYGEN SATURATION: 97 % | SYSTOLIC BLOOD PRESSURE: 115 MMHG | DIASTOLIC BLOOD PRESSURE: 63 MMHG | RESPIRATION RATE: 18 BRPM | BODY MASS INDEX: 25 KG/M2

## 2025-08-19 DIAGNOSIS — E83.51 LOW CALCIUM LEVELS: Primary | ICD-10-CM

## 2025-08-19 LAB
ANION GAP SERPL CALCULATED.3IONS-SCNC: 13 MMOL/L (ref 7–16)
BUN BLDV-MCNC: 16 MG/DL (ref 8–23)
CALCIUM SERPL-MCNC: 8.7 MG/DL (ref 8.8–10.2)
CHLORIDE BLD-SCNC: 105 MMOL/L (ref 98–107)
CO2: 22 MMOL/L (ref 22–29)
CREAT SERPL-MCNC: 0.9 MG/DL (ref 0.5–1)
GFR, ESTIMATED: 67 ML/MIN/1.73M2
GLUCOSE BLD-MCNC: 123 MG/DL (ref 74–99)
POTASSIUM SERPL-SCNC: 3.9 MMOL/L (ref 3.5–5.1)
SODIUM BLD-SCNC: 140 MMOL/L (ref 136–145)

## (undated) DEVICE — GLOVE SURG L12IN SZ 65FNGR THK94MIL TRNSLUC YEL LTX

## (undated) DEVICE — TUBING, SUCTION, 3/16" X 12', STRAIGHT: Brand: MEDLINE

## (undated) DEVICE — PLASMABLADE PS210-030S 3.0S LOCK: Brand: PLASMABLADE™

## (undated) DEVICE — GOWN,SIRUS,FABRNF,L,20/CS: Brand: MEDLINE

## (undated) DEVICE — CONTROL SYRINGE LUER-LOCK TIP: Brand: MONOJECT

## (undated) DEVICE — SET INST MINOR PROCEDURE

## (undated) DEVICE — GARMENT,MEDLINE,DVT,INT,CALF,MED, GEN2: Brand: MEDLINE

## (undated) DEVICE — TOWEL,OR,DSP,ST,BLUE,STD,6/PK,12PK/CS: Brand: MEDLINE

## (undated) DEVICE — STANDARD HYPODERMIC NEEDLE,ALUMINUM HUB: Brand: MONOJECT

## (undated) DEVICE — MARKER SURG MARGIN STD 6 CLR INK ASST CORR CLP

## (undated) DEVICE — PACK,UNIV, II AURORA: Brand: MEDLINE

## (undated) DEVICE — PATIENT RETURN ELECTRODE, SINGLE-USE, CONTACT QUALITY MONITORING, ADULT, WITH 9FT CORD, FOR PATIENTS WEIGING OVER 33LBS. (15KG): Brand: MEGADYNE

## (undated) DEVICE — DRAPE THER FLUID WARMING 66X44 IN FLAT SLUSH DBL DISC ORS

## (undated) DEVICE — SURGICAL PROCEDURE PACK BASIC

## (undated) DEVICE — APPLIER LIG CLP M L11IN TI STR RNG HNDL FOR 20 CLP DISP

## (undated) DEVICE — CHLORAPREP 26ML ORANGE

## (undated) DEVICE — Device

## (undated) DEVICE — DRESSING COMP W4XL4IN N ADH PD W2.5XL2.5IN GZ BORDERED ADH

## (undated) DEVICE — STRIP,CLOSURE,WOUND,MEDI-STRIP,1/2X4: Brand: MEDLINE